# Patient Record
Sex: FEMALE | Race: WHITE | NOT HISPANIC OR LATINO | Employment: FULL TIME | URBAN - METROPOLITAN AREA
[De-identification: names, ages, dates, MRNs, and addresses within clinical notes are randomized per-mention and may not be internally consistent; named-entity substitution may affect disease eponyms.]

---

## 2017-02-03 ENCOUNTER — HISTORICAL (OUTPATIENT)
Dept: ADMINISTRATIVE | Facility: HOSPITAL | Age: 32
End: 2017-02-03

## 2018-11-28 ENCOUNTER — HISTORICAL (OUTPATIENT)
Dept: LAB | Facility: HOSPITAL | Age: 33
End: 2018-11-28

## 2019-01-28 ENCOUNTER — HISTORICAL (OUTPATIENT)
Dept: LAB | Facility: HOSPITAL | Age: 34
End: 2019-01-28

## 2019-05-06 ENCOUNTER — HISTORICAL (OUTPATIENT)
Dept: LAB | Facility: HOSPITAL | Age: 34
End: 2019-05-06

## 2019-06-21 ENCOUNTER — HISTORICAL (OUTPATIENT)
Dept: LAB | Facility: HOSPITAL | Age: 34
End: 2019-06-21

## 2019-12-27 ENCOUNTER — HISTORICAL (OUTPATIENT)
Dept: ADMINISTRATIVE | Facility: HOSPITAL | Age: 34
End: 2019-12-27

## 2020-05-05 ENCOUNTER — HISTORICAL (OUTPATIENT)
Dept: LAB | Facility: HOSPITAL | Age: 35
End: 2020-05-05

## 2021-09-07 LAB
PAP RECOMMENDATION EXT: NORMAL
PAP SMEAR: NORMAL

## 2022-10-04 ENCOUNTER — CLINICAL SUPPORT (OUTPATIENT)
Dept: URGENT CARE | Facility: CLINIC | Age: 37
End: 2022-10-04
Payer: COMMERCIAL

## 2022-10-04 DIAGNOSIS — R52 BODY ACHES: Primary | ICD-10-CM

## 2022-10-04 DIAGNOSIS — R52 BODY ACHES: ICD-10-CM

## 2022-10-04 LAB
CTP QC/QA: YES
SARS-COV-2 RDRP RESP QL NAA+PROBE: POSITIVE

## 2022-10-04 PROCEDURE — 87635 SARS-COV-2 COVID-19 AMP PRB: CPT | Mod: QW,,, | Performed by: INTERNAL MEDICINE

## 2022-10-04 PROCEDURE — 87635: ICD-10-PCS | Mod: QW,,, | Performed by: INTERNAL MEDICINE

## 2022-10-04 NOTE — PROGRESS NOTES
Subjective:       Patient ID: Berna Miranda is a 36 y.o. female.    Vitals:  vitals were not taken for this visit.     Chief Complaint: No chief complaint on file.    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   ROS    Objective:      Physical Exam      Assessment:       1. Body aches            Plan:         Body aches  -     POCT COVID-19 Rapid Screening

## 2022-10-07 ENCOUNTER — TELEPHONE (OUTPATIENT)
Dept: INTERNAL MEDICINE | Facility: CLINIC | Age: 37
End: 2022-10-07
Payer: COMMERCIAL

## 2022-10-07 ENCOUNTER — LAB VISIT (OUTPATIENT)
Dept: LAB | Facility: HOSPITAL | Age: 37
End: 2022-10-07
Attending: OBSTETRICS & GYNECOLOGY
Payer: COMMERCIAL

## 2022-10-07 DIAGNOSIS — E03.9 HYPOTHYROIDISM, UNSPECIFIED TYPE: Primary | ICD-10-CM

## 2022-10-07 DIAGNOSIS — E34.9 ENDOCRINE DISORDER RELATED TO PUBERTY: Primary | ICD-10-CM

## 2022-10-07 LAB
FSH SERPL-ACNC: 5.89 MIU/ML
PROLACTIN LEVEL (OHS): 5.9 NG/ML (ref 5.18–26.53)
TSH SERPL-ACNC: 0.02 UIU/ML (ref 0.35–4.94)

## 2022-10-07 PROCEDURE — 83001 ASSAY OF GONADOTROPIN (FSH): CPT

## 2022-10-07 PROCEDURE — 36415 COLL VENOUS BLD VENIPUNCTURE: CPT

## 2022-10-07 PROCEDURE — 84443 ASSAY THYROID STIM HORMONE: CPT

## 2022-10-07 PROCEDURE — 84146 ASSAY OF PROLACTIN: CPT

## 2022-10-07 RX ORDER — LEVOTHYROXINE SODIUM 175 UG/1
175 TABLET ORAL DAILY
Qty: 90 TABLET | Refills: 1 | Status: SHIPPED | OUTPATIENT
Start: 2022-10-07 | End: 2022-10-12 | Stop reason: DRUGHIGH

## 2022-10-07 RX ORDER — LEVOTHYROXINE SODIUM 175 UG/1
175 TABLET ORAL DAILY
COMMUNITY
Start: 2022-09-09 | End: 2022-10-07 | Stop reason: SDUPTHER

## 2022-10-11 ENCOUNTER — TELEPHONE (OUTPATIENT)
Dept: INTERNAL MEDICINE | Facility: CLINIC | Age: 37
End: 2022-10-11
Payer: COMMERCIAL

## 2022-10-12 ENCOUNTER — OFFICE VISIT (OUTPATIENT)
Dept: INTERNAL MEDICINE | Facility: CLINIC | Age: 37
End: 2022-10-12
Payer: COMMERCIAL

## 2022-10-12 DIAGNOSIS — E03.9 ACQUIRED HYPOTHYROIDISM: Primary | ICD-10-CM

## 2022-10-12 PROCEDURE — 1159F PR MEDICATION LIST DOCUMENTED IN MEDICAL RECORD: ICD-10-PCS | Mod: CPTII,95,, | Performed by: NURSE PRACTITIONER

## 2022-10-12 PROCEDURE — 1160F PR REVIEW ALL MEDS BY PRESCRIBER/CLIN PHARMACIST DOCUMENTED: ICD-10-PCS | Mod: CPTII,95,, | Performed by: NURSE PRACTITIONER

## 2022-10-12 PROCEDURE — 99214 PR OFFICE/OUTPT VISIT, EST, LEVL IV, 30-39 MIN: ICD-10-PCS | Mod: 95,,, | Performed by: NURSE PRACTITIONER

## 2022-10-12 PROCEDURE — 1159F MED LIST DOCD IN RCRD: CPT | Mod: CPTII,95,, | Performed by: NURSE PRACTITIONER

## 2022-10-12 PROCEDURE — 99214 OFFICE O/P EST MOD 30 MIN: CPT | Mod: 95,,, | Performed by: NURSE PRACTITIONER

## 2022-10-12 PROCEDURE — 1160F RVW MEDS BY RX/DR IN RCRD: CPT | Mod: CPTII,95,, | Performed by: NURSE PRACTITIONER

## 2022-10-12 RX ORDER — LEVOTHYROXINE SODIUM 150 UG/1
150 TABLET ORAL
Qty: 30 TABLET | Refills: 11 | Status: SHIPPED | OUTPATIENT
Start: 2022-10-12 | End: 2022-11-22 | Stop reason: DRUGHIGH

## 2022-10-12 NOTE — PROGRESS NOTES
Patient ID: [id number]     Chief Complaint: Follow-up (Pt saw Dr Bloom and he did labs but TSH is really low and wanted her to f/u with provider and wants to know if synthroid med needs adjusting.)      HPI:     This is a telemedicine note. Patient was treated using telemedicine, real time audio and video, according to [facility] protocols. I, Anastasiia SHETH, conducted the visit from the [facility]. The patient participated in the visit at a non-[facility] location selected by the patient, identified below. I am licensed in the state where the patient stated they are located. The patient stated that they understood and accepted the privacy and security risks to their information at their location. This visit is not recorded.    Patient was located at the patient's home.       Berna Miranda is a 36 y.o. female here today for a telemedicine visit. Patient currently undergoing eval for fertility and TSH noted to be suppressed. Feeling ok otherwise without complaints.        ----------------------------  Hypothyroidism, unspecified     Past Surgical History:   Procedure Laterality Date    THYROIDECTOMY  2013       Review of patient's allergies indicates:  No Known Allergies    No outpatient medications have been marked as taking for the 10/12/22 encounter (Office Visit) with Anastasiia Trevino NP.       Social History     Socioeconomic History    Marital status:    Tobacco Use    Smoking status: Never    Smokeless tobacco: Never   Substance and Sexual Activity    Alcohol use: Yes     Alcohol/week: 2.0 - 3.0 standard drinks     Types: 2 - 3 Glasses of wine per week    Sexual activity: Yes        Family History   Problem Relation Age of Onset    Hypertension Father     Atrial fibrillation Father         Patient Care Team:  Primary Doctor No as PCP - General  Primary Doctor No      Subjective:       ROS  Answers submitted by the patient for this visit:  Review of Systems Questionnaire  (Submitted on 10/11/2022)  activity change: No  trouble swallowing: No  chest tightness: No  difficulty urinating: No  dysphoric mood: No    See HPI for details    All Other ROS: Negative except as stated in HPI.       Objective:     There were no vitals taken for this visit.    Physical Exam    Physical Exam: LIMITED DUE TO TELEMEDICINE RESTRICTIONS.  General: Alert and oriented, No acute distress.  Head: Normocephalic, Atraumatic.  Eye: Sclera non-icteric.  Neck/Thyroid:  Full range of motion.  Respiratory: Non-labored respirations, Symmetrical chest wall expansion.  Neurologic: No focal deficits  Psychiatric: Normal interaction, Coherent speech, Euthymic mood, Appropriate affect       Assessment:       ICD-10-CM ICD-9-CM   1. Acquired hypothyroidism  E03.9 244.9        Plan:     1. Acquired hypothyroidism  - levothyroxine (SYNTHROID) 150 MCG tablet; Take 1 tablet (150 mcg total) by mouth before breakfast.  Dispense: 30 tablet; Refill: 11  - TSH; Future  - T4, Free; Future    Dec levothyroxine to 150 mcg daily. Repeat TSH/free T4 in 6-8 weeks. Will forward results to Gyn once received.     Medication List with Changes/Refills   New Medications    LEVOTHYROXINE (SYNTHROID) 150 MCG TABLET    Take 1 tablet (150 mcg total) by mouth before breakfast.       Start Date: 10/12/2022End Date: 10/12/2023   Current Medications    LEVOTHYROXINE (SYNTHROID, LEVOTHROID) 175 MCG TABLET    Take 1 tablet (175 mcg total) by mouth once daily.       Start Date: 10/7/2022 End Date: --          No follow-ups on file. In addition to their scheduled follow up, the patient has also been instructed to follow up on as needed basis.       Video Time Documentation:  Spent 10 minutes with patient face to face discussed health concerns. More than 50% of this time was spent in counseling and coordination of care.

## 2022-11-22 ENCOUNTER — LAB VISIT (OUTPATIENT)
Dept: LAB | Facility: HOSPITAL | Age: 37
End: 2022-11-22
Attending: NURSE PRACTITIONER
Payer: COMMERCIAL

## 2022-11-22 DIAGNOSIS — E03.9 HYPOTHYROIDISM, UNSPECIFIED TYPE: Primary | ICD-10-CM

## 2022-11-22 DIAGNOSIS — E03.9 ACQUIRED HYPOTHYROIDISM: ICD-10-CM

## 2022-11-22 LAB
T4 FREE SERPL-MCNC: 1.33 NG/DL (ref 0.7–1.48)
TSH SERPL-ACNC: 0.12 UIU/ML (ref 0.35–4.94)

## 2022-11-22 PROCEDURE — 36415 COLL VENOUS BLD VENIPUNCTURE: CPT

## 2022-11-22 PROCEDURE — 84439 ASSAY OF FREE THYROXINE: CPT

## 2022-11-22 PROCEDURE — 84443 ASSAY THYROID STIM HORMONE: CPT

## 2022-11-22 RX ORDER — LEVOTHYROXINE SODIUM 137 UG/1
137 TABLET ORAL
Qty: 30 TABLET | Refills: 11 | Status: SHIPPED | OUTPATIENT
Start: 2022-11-22 | End: 2023-08-21 | Stop reason: SDUPTHER

## 2023-01-04 ENCOUNTER — LAB VISIT (OUTPATIENT)
Dept: LAB | Facility: HOSPITAL | Age: 38
End: 2023-01-04
Attending: INTERNAL MEDICINE
Payer: COMMERCIAL

## 2023-01-04 ENCOUNTER — TELEPHONE (OUTPATIENT)
Dept: INTERNAL MEDICINE | Facility: CLINIC | Age: 38
End: 2023-01-04
Payer: COMMERCIAL

## 2023-01-04 DIAGNOSIS — E03.9 HYPOTHYROIDISM, UNSPECIFIED TYPE: ICD-10-CM

## 2023-01-04 LAB
T4 FREE SERPL-MCNC: 1.17 NG/DL (ref 0.7–1.48)
TSH SERPL-ACNC: 2.38 UIU/ML (ref 0.35–4.94)

## 2023-01-04 PROCEDURE — 36415 COLL VENOUS BLD VENIPUNCTURE: CPT

## 2023-01-04 PROCEDURE — 84439 ASSAY OF FREE THYROXINE: CPT

## 2023-01-04 PROCEDURE — 84443 ASSAY THYROID STIM HORMONE: CPT

## 2023-01-04 NOTE — TELEPHONE ENCOUNTER
----- Message from Anastasiia Trevino NP sent at 1/4/2023 12:20 PM CST -----  Please let pt know repeat labs stable.  Continue current dose of meds

## 2023-01-07 ENCOUNTER — DOCUMENTATION ONLY (OUTPATIENT)
Dept: INTERNAL MEDICINE | Facility: CLINIC | Age: 38
End: 2023-01-07
Payer: COMMERCIAL

## 2023-02-27 ENCOUNTER — LAB REQUISITION (OUTPATIENT)
Dept: LAB | Facility: HOSPITAL | Age: 38
End: 2023-02-27
Payer: COMMERCIAL

## 2023-02-27 DIAGNOSIS — Z34.01 ENCOUNTER FOR SUPERVISION OF NORMAL FIRST PREGNANCY, FIRST TRIMESTER: ICD-10-CM

## 2023-02-27 PROCEDURE — 87491 CHLMYD TRACH DNA AMP PROBE: CPT | Performed by: OBSTETRICS & GYNECOLOGY

## 2023-03-02 LAB
C TRACH RRNA SPEC QL NAA+PROBE: NEGATIVE
N GONORRHOEA RRNA SPEC QL NAA+PROBE: NEGATIVE
SPECIMEN SOURCE: NORMAL
SPECIMEN SOURCE: NORMAL

## 2023-03-03 ENCOUNTER — LAB VISIT (OUTPATIENT)
Dept: LAB | Facility: HOSPITAL | Age: 38
End: 2023-03-03
Attending: OBSTETRICS & GYNECOLOGY
Payer: COMMERCIAL

## 2023-03-03 DIAGNOSIS — Z34.01 ENCOUNTER FOR SUPERVISION OF NORMAL FIRST PREGNANCY IN FIRST TRIMESTER: Primary | ICD-10-CM

## 2023-03-03 LAB
ABORH RETYPE: NORMAL
ERYTHROCYTE [DISTWIDTH] IN BLOOD BY AUTOMATED COUNT: 11.9 % (ref 11.5–17)
GROUP & RH: NORMAL
HBV SURFACE AG SERPL QL IA: NONREACTIVE
HCT VFR BLD AUTO: 43.4 % (ref 37–47)
HGB BLD-MCNC: 14.8 G/DL (ref 12–16)
HIV 1+2 AB+HIV1 P24 AG SERPL QL IA: NONREACTIVE
INDIRECT COOMBS GEL: NORMAL
MCH RBC QN AUTO: 30.1 PG
MCHC RBC AUTO-ENTMCNC: 34.1 G/DL (ref 33–36)
MCV RBC AUTO: 88.4 FL (ref 80–94)
NRBC BLD AUTO-RTO: 0 %
PLATELET # BLD AUTO: 260 X10(3)/MCL (ref 130–400)
PMV BLD AUTO: 9.3 FL (ref 7.4–10.4)
RBC # BLD AUTO: 4.91 X10(6)/MCL (ref 4.2–5.4)
RUBELLA IMMUNE STATUS: NORMAL
T PALLIDUM AB SER QL: NONREACTIVE
WBC # SPEC AUTO: 10.2 X10(3)/MCL (ref 4.5–11.5)

## 2023-03-03 PROCEDURE — 87088 URINE BACTERIA CULTURE: CPT

## 2023-03-03 PROCEDURE — 87340 HEPATITIS B SURFACE AG IA: CPT

## 2023-03-03 PROCEDURE — 36415 COLL VENOUS BLD VENIPUNCTURE: CPT

## 2023-03-03 PROCEDURE — 86762 RUBELLA ANTIBODY: CPT

## 2023-03-03 PROCEDURE — 86780 TREPONEMA PALLIDUM: CPT

## 2023-03-03 PROCEDURE — 85660 RBC SICKLE CELL TEST: CPT

## 2023-03-03 PROCEDURE — 85027 COMPLETE CBC AUTOMATED: CPT

## 2023-03-03 PROCEDURE — 86900 BLOOD TYPING SEROLOGIC ABO: CPT | Performed by: OBSTETRICS & GYNECOLOGY

## 2023-03-03 PROCEDURE — 87389 HIV-1 AG W/HIV-1&-2 AB AG IA: CPT

## 2023-03-04 LAB
RUBV IGG SERPL IA-ACNC: 2
RUBV IGG SERPL QL IA: POSITIVE

## 2023-03-05 LAB
BACTERIA UR CULT: NORMAL
HGB S BLD QL SOLY: NEGATIVE

## 2023-03-20 ENCOUNTER — TELEPHONE (OUTPATIENT)
Dept: INTERNAL MEDICINE | Facility: CLINIC | Age: 38
End: 2023-03-20
Payer: COMMERCIAL

## 2023-03-20 DIAGNOSIS — Z00.00 WELLNESS EXAMINATION: Primary | ICD-10-CM

## 2023-03-20 DIAGNOSIS — E03.9 HYPOTHYROIDISM, UNSPECIFIED TYPE: ICD-10-CM

## 2023-03-20 NOTE — TELEPHONE ENCOUNTER
----- Message from Ernestine Maya MA sent at 3/20/2023 11:20 AM CDT -----  Regarding: YESSENIA Laurent 3/29@8:20  Please call pt and remind of appt dated above and ask to have fasting labs done prior. Please and thank you.

## 2023-03-23 ENCOUNTER — TELEPHONE (OUTPATIENT)
Dept: INTERNAL MEDICINE | Facility: CLINIC | Age: 38
End: 2023-03-23
Payer: COMMERCIAL

## 2023-03-23 ENCOUNTER — LAB VISIT (OUTPATIENT)
Dept: LAB | Facility: HOSPITAL | Age: 38
End: 2023-03-23
Attending: INTERNAL MEDICINE
Payer: COMMERCIAL

## 2023-03-23 DIAGNOSIS — O99.282 HYPOTHYROIDISM DURING PREGNANCY IN SECOND TRIMESTER: Primary | ICD-10-CM

## 2023-03-23 DIAGNOSIS — E03.9 HYPOTHYROIDISM DURING PREGNANCY IN SECOND TRIMESTER: Primary | ICD-10-CM

## 2023-03-23 DIAGNOSIS — E03.9 HYPOTHYROIDISM, UNSPECIFIED TYPE: Primary | ICD-10-CM

## 2023-03-23 DIAGNOSIS — Z00.00 WELLNESS EXAMINATION: ICD-10-CM

## 2023-03-23 DIAGNOSIS — E03.9 HYPOTHYROIDISM, UNSPECIFIED TYPE: ICD-10-CM

## 2023-03-23 LAB
ALBUMIN SERPL-MCNC: 3.7 G/DL (ref 3.5–5)
ALBUMIN/GLOB SERPL: 1.2 RATIO (ref 1.1–2)
ALP SERPL-CCNC: 53 UNIT/L (ref 40–150)
ALT SERPL-CCNC: 15 UNIT/L (ref 0–55)
AST SERPL-CCNC: 14 UNIT/L (ref 5–34)
BILIRUBIN DIRECT+TOT PNL SERPL-MCNC: 0.6 MG/DL
BUN SERPL-MCNC: 8 MG/DL (ref 7–18.7)
CALCIUM SERPL-MCNC: 9.4 MG/DL (ref 8.4–10.2)
CHLORIDE SERPL-SCNC: 107 MMOL/L (ref 98–107)
CHOLEST SERPL-MCNC: 236 MG/DL
CHOLEST/HDLC SERPL: 5 {RATIO} (ref 0–5)
CO2 SERPL-SCNC: 24 MMOL/L (ref 22–29)
CREAT SERPL-MCNC: 0.72 MG/DL (ref 0.55–1.02)
GFR SERPLBLD CREATININE-BSD FMLA CKD-EPI: >60 MLS/MIN/1.73/M2
GLOBULIN SER-MCNC: 3.2 GM/DL (ref 2.4–3.5)
GLUCOSE SERPL-MCNC: 96 MG/DL (ref 74–100)
HDLC SERPL-MCNC: 52 MG/DL (ref 35–60)
LDLC SERPL CALC-MCNC: 158 MG/DL (ref 50–140)
POTASSIUM SERPL-SCNC: 4.3 MMOL/L (ref 3.5–5.1)
PROT SERPL-MCNC: 6.9 GM/DL (ref 6.4–8.3)
SODIUM SERPL-SCNC: 136 MMOL/L (ref 136–145)
T4 FREE SERPL-MCNC: 0.98 NG/DL (ref 0.7–1.48)
TRIGL SERPL-MCNC: 128 MG/DL (ref 37–140)
TSH SERPL-ACNC: 11.58 UIU/ML (ref 0.35–4.94)
VLDLC SERPL CALC-MCNC: 26 MG/DL

## 2023-03-23 PROCEDURE — 84443 ASSAY THYROID STIM HORMONE: CPT

## 2023-03-23 PROCEDURE — 36415 COLL VENOUS BLD VENIPUNCTURE: CPT

## 2023-03-23 PROCEDURE — 84439 ASSAY OF FREE THYROXINE: CPT

## 2023-03-23 PROCEDURE — 80061 LIPID PANEL: CPT

## 2023-03-23 PROCEDURE — 80053 COMPREHEN METABOLIC PANEL: CPT

## 2023-03-23 NOTE — TELEPHONE ENCOUNTER
Please send STAT referral to [facility] Antonio, Dr. Baca or Dr. Venegas (whoever has 1st av). Patient 13 weeks pregnant and hypothyroid.

## 2023-03-23 NOTE — TELEPHONE ENCOUNTER
Spoke to pt and let her know the endo NP will get her in next week and will recheck thyroid levels in 6 weeks. Pt has been notified to take 2 levothyroxine 137 mcg on Sundays and Thursdays and continue regular dosing all other days. Pt expressed understanding

## 2023-03-23 NOTE — TELEPHONE ENCOUNTER
Pt called asking for results of additional lab and if you had spoken to Dr Pan, T4 is resulted and pt did see results on portal, but pt was waiting for a call back.

## 2023-03-23 NOTE — TELEPHONE ENCOUNTER
----- Message from Joy Jennings sent at 3/23/2023  9:19 AM CDT -----  Regarding: lab results  .Type:  Needs Medical Advice    Who Called: pt  Symptoms (please be specific):    How long has patient had these symptoms:    Pharmacy name and phone #:    Would the patient rather a call back or a response via [facility]? Call back  Best Call Back Number:[phone]  Additional Information: pt went to have fasting labs done this morning and is concerned with results. Pt is expecting and needs nurse to f/u.

## 2023-03-24 NOTE — TELEPHONE ENCOUNTER
Spoke to Loraine Mahan NP with Endo 3/23 (Dr. Rubi or Dr. Venegas?). Will inc patient's levothyroxine as noted. Gurinder, please ensure referral sent/received and patient appt set up. Thanks.

## 2023-03-28 ENCOUNTER — OFFICE VISIT (OUTPATIENT)
Dept: MATERNAL FETAL MEDICINE | Facility: CLINIC | Age: 38
End: 2023-03-28
Payer: COMMERCIAL

## 2023-03-28 ENCOUNTER — PROCEDURE VISIT (OUTPATIENT)
Dept: MATERNAL FETAL MEDICINE | Facility: CLINIC | Age: 38
End: 2023-03-28
Payer: COMMERCIAL

## 2023-03-28 VITALS
SYSTOLIC BLOOD PRESSURE: 116 MMHG | WEIGHT: 185.31 LBS | HEART RATE: 69 BPM | BODY MASS INDEX: 29.09 KG/M2 | HEIGHT: 67 IN | DIASTOLIC BLOOD PRESSURE: 74 MMHG

## 2023-03-28 DIAGNOSIS — O99.281 HYPOTHYROIDISM AFFECTING PREGNANCY IN FIRST TRIMESTER: ICD-10-CM

## 2023-03-28 DIAGNOSIS — E03.9 HYPOTHYROIDISM AFFECTING PREGNANCY IN FIRST TRIMESTER: ICD-10-CM

## 2023-03-28 DIAGNOSIS — O09.511 AMA (ADVANCED MATERNAL AGE) PRIMIGRAVIDA 35+, FIRST TRIMESTER: Primary | ICD-10-CM

## 2023-03-28 DIAGNOSIS — O09.511 AMA (ADVANCED MATERNAL AGE) PRIMIGRAVIDA 35+, FIRST TRIMESTER: ICD-10-CM

## 2023-03-28 DIAGNOSIS — O09.511 PRIMIGRAVIDA OF ADVANCED MATERNAL AGE IN FIRST TRIMESTER: ICD-10-CM

## 2023-03-28 PROCEDURE — 3074F PR MOST RECENT SYSTOLIC BLOOD PRESSURE < 130 MM HG: ICD-10-PCS | Mod: CPTII,S$GLB,, | Performed by: OBSTETRICS & GYNECOLOGY

## 2023-03-28 PROCEDURE — 1160F RVW MEDS BY RX/DR IN RCRD: CPT | Mod: CPTII,S$GLB,, | Performed by: OBSTETRICS & GYNECOLOGY

## 2023-03-28 PROCEDURE — 3078F DIAST BP <80 MM HG: CPT | Mod: CPTII,S$GLB,, | Performed by: OBSTETRICS & GYNECOLOGY

## 2023-03-28 PROCEDURE — 76813 OB US NUCHAL MEAS 1 GEST: CPT | Mod: S$GLB,,, | Performed by: OBSTETRICS & GYNECOLOGY

## 2023-03-28 PROCEDURE — 1159F PR MEDICATION LIST DOCUMENTED IN MEDICAL RECORD: ICD-10-PCS | Mod: CPTII,S$GLB,, | Performed by: OBSTETRICS & GYNECOLOGY

## 2023-03-28 PROCEDURE — 3074F SYST BP LT 130 MM HG: CPT | Mod: CPTII,S$GLB,, | Performed by: OBSTETRICS & GYNECOLOGY

## 2023-03-28 PROCEDURE — 99204 OFFICE O/P NEW MOD 45 MIN: CPT | Mod: 25,S$GLB,, | Performed by: OBSTETRICS & GYNECOLOGY

## 2023-03-28 PROCEDURE — 3008F PR BODY MASS INDEX (BMI) DOCUMENTED: ICD-10-PCS | Mod: CPTII,S$GLB,, | Performed by: OBSTETRICS & GYNECOLOGY

## 2023-03-28 PROCEDURE — 1160F PR REVIEW ALL MEDS BY PRESCRIBER/CLIN PHARMACIST DOCUMENTED: ICD-10-PCS | Mod: CPTII,S$GLB,, | Performed by: OBSTETRICS & GYNECOLOGY

## 2023-03-28 PROCEDURE — 99204 PR OFFICE/OUTPT VISIT, NEW, LEVL IV, 45-59 MIN: ICD-10-PCS | Mod: 25,S$GLB,, | Performed by: OBSTETRICS & GYNECOLOGY

## 2023-03-28 PROCEDURE — 76813 PR US, OB NUCHAL, TRANSABDOM/TRANSVAG, FIRST GESTATION: ICD-10-PCS | Mod: S$GLB,,, | Performed by: OBSTETRICS & GYNECOLOGY

## 2023-03-28 PROCEDURE — 3078F PR MOST RECENT DIASTOLIC BLOOD PRESSURE < 80 MM HG: ICD-10-PCS | Mod: CPTII,S$GLB,, | Performed by: OBSTETRICS & GYNECOLOGY

## 2023-03-28 PROCEDURE — 1159F MED LIST DOCD IN RCRD: CPT | Mod: CPTII,S$GLB,, | Performed by: OBSTETRICS & GYNECOLOGY

## 2023-03-28 PROCEDURE — 3008F BODY MASS INDEX DOCD: CPT | Mod: CPTII,S$GLB,, | Performed by: OBSTETRICS & GYNECOLOGY

## 2023-03-28 RX ORDER — ONDANSETRON 8 MG/1
TABLET, ORALLY DISINTEGRATING ORAL
COMMUNITY
Start: 2023-02-27

## 2023-03-28 RX ORDER — FAMOTIDINE 20 MG/1
20 TABLET, FILM COATED ORAL DAILY
COMMUNITY

## 2023-03-28 NOTE — PROGRESS NOTES
MATERNAL-FETAL MEDICINE   CONSULT NOTE    Provider requesting consultation: Dr FLORA Bloom    SUBJECTIVE:     Ms. Berna Miranda is a 37 y.o.  female with IUP at 13w3d who is seen in consultation by MFM for evaluation and management of:  Problem   1. Hypothyroidism affecting pregnancy in first trimester   2. Primigravida of advanced maternal age in first trimester       She is doing well and has no current complaints.  She has been referred due to a history of hypothyroidism after thyroidectomy 10 years ago (Hashimoto's thyroiditis with a goiter that was obstructing her airway).  She has been managed on Synthroid since that surgery and has been well controlled.  In October, her TSH was noted to be low at 0.024 and her Synthroid was decreased from 175 mcg to 150 mcg.  A month later her TSH was still low at 0.1 at her dose was decreased from 150 mcg to 137 mcg.  In January her TSH was 2.3 with normal free T4 and she continued 137 mg.  She then conceived her pregnancy and her last TSH on  was 11.5 with a free T4 of 0.98.  She states that she has had vomiting in the last few weeks in the morning after she takes her medication generally.  This has occurred about 2-3 days per week for the last few weeks.  Her primary care is referring her to Endocrinology and the endocrinology office has recommended that she take 137 mcg and 274mg(2 tablets) on  and Sundays at this time.     She is AMA and her NIPT was low risk.  She is having a boy!  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  They report no history of familial genetic disorders.    Medication List with Changes/Refills   Current Medications    FAMOTIDINE (PEPCID) 20 MG TABLET    Take 20 mg by mouth once daily.    LEVOTHYROXINE (SYNTHROID) 137 MCG TAB TABLET    Take 1 tablet (137 mcg total) by mouth before breakfast.    ONDANSETRON (ZOFRAN-ODT) 8 MG TBDL    DISSOLVE 1 TABLET ON YOUR TONGUE EVERY EIGHT TO TWELVE HOURS FOR NAUSEA OR  "VOMITING    PRENATAL VIT NO.124/IRON/FOLIC (PRENATAL VITAMIN ORAL)    Take by mouth.       Review of patient's allergies indicates:  No Known Allergies    PMH:  Past Medical History:   Diagnosis Date    Hypothyroidism, unspecified     s/p thyroidectomy       PObHx:  OB History    Para Term  AB Living   1             SAB IAB Ectopic Multiple Live Births                  # Outcome Date GA Lbr Gold/2nd Weight Sex Delivery Anes PTL Lv   1 Current                PSH:  Past Surgical History:   Procedure Laterality Date    THYROIDECTOMY         Family history:family history includes Atrial fibrillation in her father; Hypertension in her father.    Social history: reports that she has never smoked. She has never used smokeless tobacco. She reports that she does not currently use alcohol after a past usage of about 2.0 - 3.0 standard drinks per week. She reports that she does not use drugs.    Genetic history: The patient denies any inherited genetic diseases or birth defects in herself or her partner's personal history or family.    Objective:   /74 (BP Location: Right arm)   Pulse 69   Ht 5' 7" (1.702 m)   Wt 84.1 kg (185 lb 4.8 oz)   BMI 29.02 kg/m²     Ultrasound performed. See viewpoint for full ultrasound report.    A garcia living IUP is identified, and the CRL is appropriate for established gestational age. The NT measures normally at 1.0mm. Maternal structures such as uterus, cervix, and ovaries appear normal.    Significant labs/imaging: low risk NIPT    ASSESSMENT/PLAN:     37 y.o.  female with IUP at 13w3d     1. Hypothyroidism affecting pregnancy in first trimester  She has a history of Hashimoto's, thyroid nodules, and a goiter (airway obstruction). She had a thyroidectomy 10 years ago and has been on replacement. In October, her dosage was decreased due to low TSH. Dosage decreased, again, in November for persistently low TSH. She remained on Levothyroxine 137mcg due to stable " TSH until recently. She repeated labs in the end of March which revealed a TSH level of 11. She was instructed by her internal med team to take two 137mcg tablets twice weekly on Thursdays and Sundays, and take 1 tablet the rest of the days of the week. An endocrinology consult was placed which is expected to be scheduled this week.    Thyroid requirements increase in pregnancy due to increases in metabolism and thyroid binding globulin. Thyroid function tests should be followed closely to ensure adequate treatment.  Hypothyroidism has been associated with higher pregnancy complication rates including miscarriage, preeclampsia, abruption, low birth weight and stillbirth. Untreated hypothyroidism has also been associated with adverse fetal neurological development, but well treated hypothyroidism (i.e., euthyroid state) carries an excellent prognosis for mother and baby.      Recommendations:  Medication management:  Continue Synthroid at the current dose of 137 micrograms/day (274mcg 2days/week, 137mcg 5days/week) until endocrinology consult and further recs. She has been referred by her PCP and is anticipating appointment in the next 2 weeks.  Up to 1/3 of women may require increased hormone replacement in the first trimester, thus consider an empiric 25% increase in medication dose at pregnancy confirmation, while discharging postpartum patients on their pre-pregnancy dose.  Laboratory evaluation:   TSH should be monitored at least every trimester, or every 4 weeks after any medication adjustment. Adjust treatment as necessary to maintain TSH goal < 2.5 mIU/L.  We will monitor fetal growth q4-6 weeks due to increased risk of growth restriction.       2. Primigravida of advanced maternal age in first trimester  Today I counseled the patient on the relationship between maternal age and fetal aneuploidy.  We discussed the risks and benefits of screening tests versus definitive genetic testing (amniocentesis). She was  counseled about her specific age-related risk of fetal aneuploidy. We discussed the limitations of ultrasound in the definitive diagnosis of fetal aneuploidy.  We also discussed cell free fetal DNA screening including the sensitivity and specificity of the test.  Her questions were answered and after today's consultation she did not want to pursue amniocentesis.  She completed a NIPT with low risk results.  Additionally, we discussed the association between advanced maternal age (AMA) and preeclampsia, gestational diabetes, and fetal growth restriction.    Recommendations:  Detailed anatomic survey at 19-20 weeks    Follow-up fetal ultrasound at 32-34 weeks for interval fetal growth  Initiate low dose aspirin at 12-16 weeks for preeclampsia risk reduction          FOLLOW UP:   F/u in 5 weeks for US/MFM visit    This consultation was completed with the assistance of Maribell Trevino NP.      Janice Ramirez  Maternal-Fetal Medicine    Electronically Signed by Janice Ramirez March 28, 2023

## 2023-03-28 NOTE — ASSESSMENT & PLAN NOTE
Today I counseled the patient on the relationship between maternal age and fetal aneuploidy.  We discussed the risks and benefits of screening tests versus definitive genetic testing (amniocentesis). She was counseled about her specific age-related risk of fetal aneuploidy. We discussed the limitations of ultrasound in the definitive diagnosis of fetal aneuploidy.  We also discussed cell free fetal DNA screening including the sensitivity and specificity of the test.  Her questions were answered and after today's consultation she did not want to pursue amniocentesis.  She completed a NIPT with low risk results.  Additionally, we discussed the association between advanced maternal age (AMA) and preeclampsia, gestational diabetes, and fetal growth restriction.    Recommendations:   Detailed anatomic survey at 19-20 weeks     Follow-up fetal ultrasound at 32-34 weeks for interval fetal growth   Initiate low dose aspirin at 12-16 weeks for preeclampsia risk reduction

## 2023-03-28 NOTE — ASSESSMENT & PLAN NOTE
She has a history of Hashimoto's, thyroid nodules, and a goiter (airway obstruction). She had a thyroidectomy 10 years ago and has been on replacement. In October, her dosage was decreased due to low TSH. Dosage decreased, again, in November for persistently low TSH. She remained on Levothyroxine 137mcg due to stable TSH until recently. She repeated labs in the end of March which revealed a TSH level of 11. She was instructed by her internal med team to take two 137mcg tablets twice weekly on Thursdays and Sundays, and take 1 tablet the rest of the days of the week. An endocrinology consult was placed which is expected to be scheduled this week.    Thyroid requirements increase in pregnancy due to increases in metabolism and thyroid binding globulin. Thyroid function tests should be followed closely to ensure adequate treatment.  Hypothyroidism has been associated with higher pregnancy complication rates including miscarriage, preeclampsia, abruption, low birth weight and stillbirth. Untreated hypothyroidism has also been associated with adverse fetal neurological development, but well treated hypothyroidism (i.e., euthyroid state) carries an excellent prognosis for mother and baby.      Recommendations:  Medication management:   Continue Synthroid at the current dose of 137 micrograms/day (274mcg 2days/week, 137mcg 5days/week) until endocrinology consult and further recs. She has been referred by her PCP and is anticipating appointment in the next 2 weeks.   Up to 1/3 of women may require increased hormone replacement in the first trimester, thus consider an empiric 25% increase in medication dose at pregnancy confirmation, while discharging postpartum patients on their pre-pregnancy dose.  Laboratory evaluation:   TSH should be monitored at least every trimester, or every 4 weeks after any medication adjustment. Adjust treatment as necessary to maintain TSH goal < 2.5 mIU/L.  · We will monitor fetal growth q4-6  weeks due to increased risk of growth restriction.

## 2023-03-29 ENCOUNTER — OFFICE VISIT (OUTPATIENT)
Dept: INTERNAL MEDICINE | Facility: CLINIC | Age: 38
End: 2023-03-29
Payer: COMMERCIAL

## 2023-03-29 VITALS
WEIGHT: 187 LBS | RESPIRATION RATE: 14 BRPM | HEART RATE: 79 BPM | DIASTOLIC BLOOD PRESSURE: 82 MMHG | BODY MASS INDEX: 29.35 KG/M2 | OXYGEN SATURATION: 98 % | HEIGHT: 67 IN | SYSTOLIC BLOOD PRESSURE: 124 MMHG

## 2023-03-29 DIAGNOSIS — E03.9 HYPOTHYROIDISM AFFECTING PREGNANCY IN FIRST TRIMESTER: ICD-10-CM

## 2023-03-29 DIAGNOSIS — Z00.00 WELL ADULT EXAM: Primary | ICD-10-CM

## 2023-03-29 DIAGNOSIS — O99.281 HYPOTHYROIDISM AFFECTING PREGNANCY IN FIRST TRIMESTER: ICD-10-CM

## 2023-03-29 PROBLEM — E07.9 DISORDER OF THYROID: Status: ACTIVE | Noted: 2023-03-29

## 2023-03-29 PROBLEM — Z97.3 WEARS GLASSES: Status: ACTIVE | Noted: 2023-03-29

## 2023-03-29 PROBLEM — F41.9 ANXIETY: Status: ACTIVE | Noted: 2023-03-29

## 2023-03-29 PROBLEM — F32.A DEPRESSION: Status: ACTIVE | Noted: 2023-03-29

## 2023-03-29 PROCEDURE — 99395 PR PREVENTIVE VISIT,EST,18-39: ICD-10-PCS | Mod: ,,, | Performed by: NURSE PRACTITIONER

## 2023-03-29 PROCEDURE — 1159F PR MEDICATION LIST DOCUMENTED IN MEDICAL RECORD: ICD-10-PCS | Mod: CPTII,,, | Performed by: NURSE PRACTITIONER

## 2023-03-29 PROCEDURE — 3074F PR MOST RECENT SYSTOLIC BLOOD PRESSURE < 130 MM HG: ICD-10-PCS | Mod: CPTII,,, | Performed by: NURSE PRACTITIONER

## 2023-03-29 PROCEDURE — 1159F MED LIST DOCD IN RCRD: CPT | Mod: CPTII,,, | Performed by: NURSE PRACTITIONER

## 2023-03-29 PROCEDURE — 99395 PREV VISIT EST AGE 18-39: CPT | Mod: ,,, | Performed by: NURSE PRACTITIONER

## 2023-03-29 PROCEDURE — 3008F BODY MASS INDEX DOCD: CPT | Mod: CPTII,,, | Performed by: NURSE PRACTITIONER

## 2023-03-29 PROCEDURE — 3008F PR BODY MASS INDEX (BMI) DOCUMENTED: ICD-10-PCS | Mod: CPTII,,, | Performed by: NURSE PRACTITIONER

## 2023-03-29 PROCEDURE — 3074F SYST BP LT 130 MM HG: CPT | Mod: CPTII,,, | Performed by: NURSE PRACTITIONER

## 2023-03-29 PROCEDURE — 3079F PR MOST RECENT DIASTOLIC BLOOD PRESSURE 80-89 MM HG: ICD-10-PCS | Mod: CPTII,,, | Performed by: NURSE PRACTITIONER

## 2023-03-29 PROCEDURE — 3079F DIAST BP 80-89 MM HG: CPT | Mod: CPTII,,, | Performed by: NURSE PRACTITIONER

## 2023-03-29 PROCEDURE — 1160F PR REVIEW ALL MEDS BY PRESCRIBER/CLIN PHARMACIST DOCUMENTED: ICD-10-PCS | Mod: CPTII,,, | Performed by: NURSE PRACTITIONER

## 2023-03-29 PROCEDURE — 1160F RVW MEDS BY RX/DR IN RCRD: CPT | Mod: CPTII,,, | Performed by: NURSE PRACTITIONER

## 2023-03-29 NOTE — PROGRESS NOTES
"Subjective:      Patient ID: Berna Miranda is a 37 y.o. female.    Chief Complaint: Annual Exam (Pt is here for annual wellness and she is 13 weeks pregnant, pt tired and nauseous/)      HPI: Patient here today for annual wellness. Overall, doing ok. She saw MFM recently with rec to get in with Endo.  I did personally reach out to Dr. Venegas's NP, CHENTE Mahan, who stated they were backlogged and would get her in ASAP.  She is doing well.  Denies complaints.    Review of patient's allergies indicates:  No Known Allergies    Review of Systems  Constitutional: No fever, No chills, No sweats, No fatigue, No weight loss.  Eyes: No blurring.  Ear/Nose/Mouth/Throat: No nasal congestion, No vertigo.  Respiratory: No shortness of breath, No cough, No sputum production, No wheezing, No sleep apnea, No exertional dyspnea.   Cardiovascular: No chest pain, No palpitations, No claudication, No orthopnea, No peripheral edema.  Gastrointestinal: No nausea, No vomiting, No diarrhea, No rectal bleeding, No constipation, No abdominal pain.  Genitourinary: No dysuria, No hematuria, No frequency.  Endocrine: No excessive thirst, No polyuria, No cold intolerance, No heat intolerance.  Musculoskeletal: No joint pain, No muscle pain.  Integumentary: No rash, No ecchymosis.  Neurologic: No altered mental status, No headaches.  Psychiatrics: No anxiety,No depression, No SI/HI.  Objective:   Visit Vitals  /82 (BP Location: Left arm, Patient Position: Sitting, BP Method: Medium (Manual))   Pulse 79   Resp 14   Ht 5' 7" (1.702 m)   Wt 84.8 kg (187 lb)   SpO2 98%   BMI 29.29 kg/m²       Physical Exam  Vitals and nursing note reviewed.   Constitutional:       General: She is not in acute distress.     Appearance: Normal appearance. She is normal weight. She is not ill-appearing, toxic-appearing or diaphoretic.   HENT:      Head: Normocephalic and atraumatic.      Right Ear: Tympanic membrane, ear canal and external ear normal.      " Left Ear: Tympanic membrane, ear canal and external ear normal.      Nose: Nose normal.      Mouth/Throat:      Mouth: Mucous membranes are moist.      Pharynx: Oropharynx is clear. No oropharyngeal exudate or posterior oropharyngeal erythema.   Eyes:      General: No scleral icterus.        Right eye: No discharge.         Left eye: No discharge.      Extraocular Movements: Extraocular movements intact.      Conjunctiva/sclera: Conjunctivae normal.      Pupils: Pupils are equal, round, and reactive to light.   Neck:      Vascular: No carotid bruit.   Cardiovascular:      Rate and Rhythm: Normal rate and regular rhythm.      Pulses: Normal pulses.      Heart sounds: Normal heart sounds. No murmur heard.  Pulmonary:      Effort: Pulmonary effort is normal. No respiratory distress.      Breath sounds: Normal breath sounds. No wheezing or rhonchi.   Musculoskeletal:         General: Normal range of motion.      Cervical back: Normal range of motion and neck supple. No rigidity or tenderness.   Lymphadenopathy:      Cervical: No cervical adenopathy.   Skin:     General: Skin is warm and dry.   Neurological:      General: No focal deficit present.      Mental Status: She is alert and oriented to person, place, and time. Mental status is at baseline.      Motor: No weakness.   Psychiatric:         Mood and Affect: Mood normal.         Thought Content: Thought content normal.         Judgment: Judgment normal.       Assessment/Plan:   1. Well adult exam  Reviewed current labs with patient.  Follow up with specialists.    2. 1. Hypothyroidism affecting pregnancy in first trimester  Inc in levothyroxine as previously discussed.   Repeat labs in 3-4 weeks-will forward to Gyn and MFM    - TSH; Future  - T4, Free; Future      Medication List with Changes/Refills   Current Medications    FAMOTIDINE (PEPCID) 20 MG TABLET    Take 20 mg by mouth once daily.    LEVOTHYROXINE (SYNTHROID) 137 MCG TAB TABLET    Take 1 tablet (137 mcg  total) by mouth before breakfast.    ONDANSETRON (ZOFRAN-ODT) 8 MG TBDL    DISSOLVE 1 TABLET ON YOUR TONGUE EVERY EIGHT TO TWELVE HOURS FOR NAUSEA OR VOMITING    PRENATAL VIT NO.124/IRON/FOLIC (PRENATAL VITAMIN ORAL)    Take by mouth.        No follow-ups on file.    Chemistry:  Lab Results   Component Value Date     03/23/2023    K 4.3 03/23/2023    CHLORIDE 107 03/23/2023    BUN 8.0 03/23/2023    CREATININE 0.72 03/23/2023    EGFRNORACEVR >60 03/23/2023    GLUCOSE 96 03/23/2023    CALCIUM 9.4 03/23/2023    ALKPHOS 53 03/23/2023    LABPROT 6.9 03/23/2023    ALBUMIN 3.7 03/23/2023    AST 14 03/23/2023    ALT 15 03/23/2023        No results found for: HGBA1C, MICROALBCREA     Hematology:  Lab Results   Component Value Date    WBC 10.2 03/03/2023    HGB 14.8 03/03/2023    HCT 43.4 03/03/2023     03/03/2023       Lipid Panel:  Lab Results   Component Value Date    CHOL 236 (H) 03/23/2023    HDL 52 03/23/2023    .00 (H) 03/23/2023    TRIG 128 03/23/2023    TOTALCHOLEST 5 03/23/2023        Urine:  No results found for: COLORUA, APPEARANCEUA, SGUA, PHUA, PROTEINUA, GLUCOSEUA, KETONESUA, BLOODUA, NITRITESUA, LEUKOCYTESUR, RBCUA, WBCUA, BACTERIA, SQEPUA, HYALINECASTS, CREATRANDUR, PROTEINURINE, UPROTCREA

## 2023-04-05 ENCOUNTER — PATIENT MESSAGE (OUTPATIENT)
Dept: MATERNAL FETAL MEDICINE | Facility: CLINIC | Age: 38
End: 2023-04-05
Payer: COMMERCIAL

## 2023-04-25 ENCOUNTER — TELEPHONE (OUTPATIENT)
Dept: INTERNAL MEDICINE | Facility: CLINIC | Age: 38
End: 2023-04-25
Payer: COMMERCIAL

## 2023-04-25 ENCOUNTER — LAB VISIT (OUTPATIENT)
Dept: LAB | Facility: HOSPITAL | Age: 38
End: 2023-04-25
Attending: NURSE PRACTITIONER
Payer: COMMERCIAL

## 2023-04-25 ENCOUNTER — TELEPHONE (OUTPATIENT)
Dept: MATERNAL FETAL MEDICINE | Facility: CLINIC | Age: 38
End: 2023-04-25
Payer: COMMERCIAL

## 2023-04-25 DIAGNOSIS — O99.281 HYPOTHYROIDISM AFFECTING PREGNANCY IN FIRST TRIMESTER: ICD-10-CM

## 2023-04-25 DIAGNOSIS — E03.9 HYPOTHYROIDISM AFFECTING PREGNANCY IN FIRST TRIMESTER: ICD-10-CM

## 2023-04-25 LAB
T4 FREE SERPL-MCNC: 1.15 NG/DL (ref 0.7–1.48)
TSH SERPL-ACNC: 2.1 UIU/ML (ref 0.35–4.94)

## 2023-04-25 PROCEDURE — 84443 ASSAY THYROID STIM HORMONE: CPT

## 2023-04-25 PROCEDURE — 84439 ASSAY OF FREE THYROXINE: CPT

## 2023-04-25 PROCEDURE — 36415 COLL VENOUS BLD VENIPUNCTURE: CPT

## 2023-04-25 NOTE — TELEPHONE ENCOUNTER
Called to discuss TFTs. TSH normal. No change in levothyroxine dosing. Verbalized understanding and denies questions.

## 2023-04-25 NOTE — TELEPHONE ENCOUNTER
----- Message from Anastasiia Trevino NP sent at 4/25/2023 12:11 PM CDT -----  Please let pt know that TSH and free T4 normal. Repeat in 6 weeks.  Orders in system. May, please forward results to Maternal-Fetal specialist

## 2023-04-27 DIAGNOSIS — O99.282 HYPOTHYROIDISM AFFECTING PREGNANCY IN SECOND TRIMESTER: ICD-10-CM

## 2023-04-27 DIAGNOSIS — E03.9 HYPOTHYROIDISM AFFECTING PREGNANCY IN SECOND TRIMESTER: ICD-10-CM

## 2023-04-27 DIAGNOSIS — O09.512 AMA (ADVANCED MATERNAL AGE) PRIMIGRAVIDA 35+, SECOND TRIMESTER: Primary | ICD-10-CM

## 2023-05-01 ENCOUNTER — PROCEDURE VISIT (OUTPATIENT)
Dept: MATERNAL FETAL MEDICINE | Facility: CLINIC | Age: 38
End: 2023-05-01
Payer: COMMERCIAL

## 2023-05-01 ENCOUNTER — OFFICE VISIT (OUTPATIENT)
Dept: MATERNAL FETAL MEDICINE | Facility: CLINIC | Age: 38
End: 2023-05-01
Payer: COMMERCIAL

## 2023-05-01 VITALS
SYSTOLIC BLOOD PRESSURE: 121 MMHG | BODY MASS INDEX: 29.75 KG/M2 | DIASTOLIC BLOOD PRESSURE: 65 MMHG | WEIGHT: 189.94 LBS | HEART RATE: 80 BPM

## 2023-05-01 DIAGNOSIS — O44.42 LOW LYING PLACENTA NOS OR WITHOUT HEMORRHAGE, SECOND TRIMESTER: ICD-10-CM

## 2023-05-01 DIAGNOSIS — O09.512 AMA (ADVANCED MATERNAL AGE) PRIMIGRAVIDA 35+, SECOND TRIMESTER: ICD-10-CM

## 2023-05-01 DIAGNOSIS — E03.9 HYPOTHYROIDISM AFFECTING PREGNANCY IN SECOND TRIMESTER: ICD-10-CM

## 2023-05-01 DIAGNOSIS — O99.282 HYPOTHYROIDISM AFFECTING PREGNANCY IN SECOND TRIMESTER: ICD-10-CM

## 2023-05-01 DIAGNOSIS — O09.512 PRIMIGRAVIDA OF ADVANCED MATERNAL AGE IN SECOND TRIMESTER: ICD-10-CM

## 2023-05-01 PROCEDURE — 1159F MED LIST DOCD IN RCRD: CPT | Mod: CPTII,S$GLB,, | Performed by: OBSTETRICS & GYNECOLOGY

## 2023-05-01 PROCEDURE — 1159F PR MEDICATION LIST DOCUMENTED IN MEDICAL RECORD: ICD-10-PCS | Mod: CPTII,S$GLB,, | Performed by: OBSTETRICS & GYNECOLOGY

## 2023-05-01 PROCEDURE — 3008F BODY MASS INDEX DOCD: CPT | Mod: CPTII,S$GLB,, | Performed by: OBSTETRICS & GYNECOLOGY

## 2023-05-01 PROCEDURE — 3074F PR MOST RECENT SYSTOLIC BLOOD PRESSURE < 130 MM HG: ICD-10-PCS | Mod: CPTII,S$GLB,, | Performed by: OBSTETRICS & GYNECOLOGY

## 2023-05-01 PROCEDURE — 76811 OB US DETAILED SNGL FETUS: CPT | Mod: S$GLB,,, | Performed by: OBSTETRICS & GYNECOLOGY

## 2023-05-01 PROCEDURE — 76811 PR US, OB FETAL EVAL & EXAM, TRANSABDOM,FIRST GESTATION: ICD-10-PCS | Mod: S$GLB,,, | Performed by: OBSTETRICS & GYNECOLOGY

## 2023-05-01 PROCEDURE — 3008F PR BODY MASS INDEX (BMI) DOCUMENTED: ICD-10-PCS | Mod: CPTII,S$GLB,, | Performed by: OBSTETRICS & GYNECOLOGY

## 2023-05-01 PROCEDURE — 1160F RVW MEDS BY RX/DR IN RCRD: CPT | Mod: CPTII,S$GLB,, | Performed by: OBSTETRICS & GYNECOLOGY

## 2023-05-01 PROCEDURE — 3078F DIAST BP <80 MM HG: CPT | Mod: CPTII,S$GLB,, | Performed by: OBSTETRICS & GYNECOLOGY

## 2023-05-01 PROCEDURE — 99214 OFFICE O/P EST MOD 30 MIN: CPT | Mod: 25,S$GLB,, | Performed by: OBSTETRICS & GYNECOLOGY

## 2023-05-01 PROCEDURE — 1160F PR REVIEW ALL MEDS BY PRESCRIBER/CLIN PHARMACIST DOCUMENTED: ICD-10-PCS | Mod: CPTII,S$GLB,, | Performed by: OBSTETRICS & GYNECOLOGY

## 2023-05-01 PROCEDURE — 99214 PR OFFICE/OUTPT VISIT, EST, LEVL IV, 30-39 MIN: ICD-10-PCS | Mod: 25,S$GLB,, | Performed by: OBSTETRICS & GYNECOLOGY

## 2023-05-01 PROCEDURE — 3074F SYST BP LT 130 MM HG: CPT | Mod: CPTII,S$GLB,, | Performed by: OBSTETRICS & GYNECOLOGY

## 2023-05-01 PROCEDURE — 3078F PR MOST RECENT DIASTOLIC BLOOD PRESSURE < 80 MM HG: ICD-10-PCS | Mod: CPTII,S$GLB,, | Performed by: OBSTETRICS & GYNECOLOGY

## 2023-05-01 RX ORDER — ASPIRIN 81 MG/1
81 TABLET ORAL DAILY
COMMUNITY

## 2023-05-01 NOTE — ASSESSMENT & PLAN NOTE
We have reviewed the relationship between maternal age and fetal aneuploidy as well as the association between advanced maternal age (AMA) and preeclampsia, gestational diabetes, and fetal growth restriction.      She completed a NIPT with low risk results.    Recommendations:   Detailed anatomic survey at 19-20 weeks  (started today, some views limited)   Follow-up fetal ultrasound at 32-34 weeks for interval fetal growth   Initiate low dose aspirin at 12-16 weeks for preeclampsia risk reduction

## 2023-05-01 NOTE — PROGRESS NOTES
Maternal Fetal Medicine follow up consult    SUBJECTIVE:     Berna Miranda is a 37 y.o.  female with IUP at 18w2d who is seen in follow up consultation by MFM.  Pregnancy complications include:   Problem   Low Lying Placenta NOS Or Without Hemorrhage, Second Trimester   Hypothyroidism Affecting Pregnancy in Second Trimester   Primigravida of Advanced Maternal Age in Second Trimester     She is doing well and has no complaints.No LOF, VB, ctx.   We discussed recommended sleeping positions today and I addressed her concerns.    They have decided to name their baby boy Jada Mckenzie).    Previous notes reviewed.   No changes to medical, surgical, family, social, or obstetric history.    Interval history since last MFM visit: no changes    Medications:  Current Outpatient Medications   Medication Instructions    aspirin (ADULT LOW DOSE ASPIRIN) 81 mg, Oral, Daily    famotidine (PEPCID) 20 mg, Oral, Daily    levothyroxine (SYNTHROID) 137 mcg, Oral, Before breakfast    ondansetron (ZOFRAN-ODT) 8 MG TbDL DISSOLVE 1 TABLET ON YOUR TONGUE EVERY EIGHT TO TWELVE HOURS FOR NAUSEA OR VOMITING    prenatal vit no.124/iron/folic (PRENATAL VITAMIN ORAL) Oral       Care team members:  Dr FLORA Bloom - Primary OB     OBJECTIVE:   /65 (BP Location: Right arm)   Pulse 80   Wt 86.1 kg (189 lb 14.8 oz)   BMI 29.75 kg/m²     Ultrasound performed. See viewpoint for full ultrasound report.    A detailed fetal anatomic ultrasound examination was performed for the following high risk indication: AMA.   No fetal structural malformations are identified; however, fetal imaging is incomplete today for the above views.  A follow-up study will be scheduled to complete the fetal anatomic survey.   Fetal size today is consistent with established gestational age.   Cervical length by TA scanning is normal.   Placental location is posterior, low lying (1.5cm away from os) without evidence of previa.     ASSESSMENT/PLAN:     37 y.o.   female with IUP at 18w2d    Hypothyroidism affecting pregnancy in second trimester  She has a history of Hashimoto's, thyroid nodules, and a goiter (airway obstruction). She had a thyroidectomy 10 years ago and has been on replacement. In October, her dosage was decreased due to low TSH. Dosage decreased, again, in November for persistently low TSH. She remained on Levothyroxine 137mcg due to stable TSH until recently. She repeated labs in the end of March which revealed a TSH level of 11. She was instructed by her internal med team to take two 137mcg tablets twice weekly on Thursdays and Sundays, and take 1 tablet the rest of the days of the week.  She recently had an outpatient consult with an endocrinologist who agreed with current plan of care.    Thyroid requirements increase in pregnancy due to increases in metabolism and thyroid binding globulin. Thyroid function tests should be followed closely to ensure adequate treatment.  Hypothyroidism has been associated with higher pregnancy complication rates including miscarriage, preeclampsia, abruption, low birth weight and stillbirth. Untreated hypothyroidism has also been associated with adverse fetal neurological development, but well treated hypothyroidism (i.e., euthyroid state) carries an excellent prognosis for mother and baby.      4/25/23- TSH normal. Dosage kept the same (137mcg daily with the exception of Thursdays and Sundays where she takes a double dosage)    Recommendations:  Medication management:  Continue Synthroid at the current dose of 137 micrograms/day (274mcg 2days/week, 137mcg 5days/week) until endocrinology consult and further recs. She has been referred by her PCP and is anticipating appointment in the next 2 weeks.  Up to 1/3 of women may require increased hormone replacement in the first trimester, thus consider an empiric 25% increase in medication dose at pregnancy confirmation, while discharging postpartum patients on their  pre-pregnancy dose.  Laboratory evaluation:   TSH should be monitored at least every trimester, or every 4 weeks after any medication adjustment. Adjust treatment as necessary to maintain TSH goal < 2.5 mIU/L.  We will monitor fetal growth q4-6 weeks due to increased risk of growth restriction.       Primigravida of advanced maternal age in second trimester  We have reviewed the relationship between maternal age and fetal aneuploidy as well as the association between advanced maternal age (AMA) and preeclampsia, gestational diabetes, and fetal growth restriction.      She completed a NIPT with low risk results.    Recommendations:  Detailed anatomic survey at 19-20 weeks  (started today, some views limited)  Follow-up fetal ultrasound at 32-34 weeks for interval fetal growth  Initiate low dose aspirin at 12-16 weeks for preeclampsia risk reduction        Low lying placenta nos or without hemorrhage, second trimester  Posterior placenta measures 1.5 cm away from the internal os (transabdominally).    No transvaginal scan was completed today  She has not had any bleeding or abruption symptoms   A low-lying placenta is a normal variant in pregnancy at the mid gestation.  This is expected to resolve spontaneously in most circumstances and there is no need to alter pregnancy management or maternal activities.   We will consider transvaginal ultrasound in the future if placental location is indeterminate on transabdominal scan.      F/u in 4 weeks for MFM visit/ultrasound    Janice Ramirez MD  Maternal Fetal Medicine

## 2023-05-01 NOTE — ASSESSMENT & PLAN NOTE
She has a history of Hashimoto's, thyroid nodules, and a goiter (airway obstruction). She had a thyroidectomy 10 years ago and has been on replacement. In October, her dosage was decreased due to low TSH. Dosage decreased, again, in November for persistently low TSH. She remained on Levothyroxine 137mcg due to stable TSH until recently. She repeated labs in the end of March which revealed a TSH level of 11. She was instructed by her internal med team to take two 137mcg tablets twice weekly on Thursdays and Sundays, and take 1 tablet the rest of the days of the week.  She recently had an outpatient consult with an endocrinologist who agreed with current plan of care.    Thyroid requirements increase in pregnancy due to increases in metabolism and thyroid binding globulin. Thyroid function tests should be followed closely to ensure adequate treatment.  Hypothyroidism has been associated with higher pregnancy complication rates including miscarriage, preeclampsia, abruption, low birth weight and stillbirth. Untreated hypothyroidism has also been associated with adverse fetal neurological development, but well treated hypothyroidism (i.e., euthyroid state) carries an excellent prognosis for mother and baby.      4/25/23- TSH normal. Dosage kept the same (137mcg daily with the exception of Thursdays and Sundays where she takes a double dosage)    Recommendations:  Medication management:   Continue Synthroid at the current dose of 137 micrograms/day (274mcg 2days/week, 137mcg 5days/week) until endocrinology consult and further recs. She has been referred by her PCP and is anticipating appointment in the next 2 weeks.   Up to 1/3 of women may require increased hormone replacement in the first trimester, thus consider an empiric 25% increase in medication dose at pregnancy confirmation, while discharging postpartum patients on their pre-pregnancy dose.  Laboratory evaluation:   TSH should be monitored at least every  trimester, or every 4 weeks after any medication adjustment. Adjust treatment as necessary to maintain TSH goal < 2.5 mIU/L.  · We will monitor fetal growth q4-6 weeks due to increased risk of growth restriction.

## 2023-05-01 NOTE — ASSESSMENT & PLAN NOTE
Posterior placenta measures 1.5 cm away from the internal os (transabdominally).    No transvaginal scan was completed today  She has not had any bleeding or abruption symptoms   A low-lying placenta is a normal variant in pregnancy at the mid gestation.  This is expected to resolve spontaneously in most circumstances and there is no need to alter pregnancy management or maternal activities.   We will consider transvaginal ultrasound in the future if placental location is indeterminate on transabdominal scan.

## 2023-05-04 ENCOUNTER — TELEPHONE (OUTPATIENT)
Dept: INTERNAL MEDICINE | Facility: CLINIC | Age: 38
End: 2023-05-04
Payer: COMMERCIAL

## 2023-05-04 NOTE — TELEPHONE ENCOUNTER
Pt said we did call her last week but documentation is not in system also forwarded results to Maternal Fetal

## 2023-05-24 ENCOUNTER — TELEPHONE (OUTPATIENT)
Dept: INTERNAL MEDICINE | Facility: CLINIC | Age: 38
End: 2023-05-24
Payer: COMMERCIAL

## 2023-05-24 ENCOUNTER — LAB VISIT (OUTPATIENT)
Dept: LAB | Facility: HOSPITAL | Age: 38
End: 2023-05-24
Attending: NURSE PRACTITIONER
Payer: COMMERCIAL

## 2023-05-24 DIAGNOSIS — E03.9 HYPOTHYROIDISM AFFECTING PREGNANCY IN SECOND TRIMESTER: Primary | ICD-10-CM

## 2023-05-24 DIAGNOSIS — E03.9 HYPOTHYROIDISM AFFECTING PREGNANCY IN FIRST TRIMESTER: ICD-10-CM

## 2023-05-24 DIAGNOSIS — O09.512 PRIMIGRAVIDA OF ADVANCED MATERNAL AGE IN SECOND TRIMESTER: ICD-10-CM

## 2023-05-24 DIAGNOSIS — O99.282 HYPOTHYROIDISM AFFECTING PREGNANCY IN SECOND TRIMESTER: Primary | ICD-10-CM

## 2023-05-24 DIAGNOSIS — O99.281 HYPOTHYROIDISM AFFECTING PREGNANCY IN FIRST TRIMESTER: ICD-10-CM

## 2023-05-24 LAB
T4 FREE SERPL-MCNC: 1.02 NG/DL (ref 0.7–1.48)
TSH SERPL-ACNC: 1.11 UIU/ML (ref 0.35–4.94)

## 2023-05-24 PROCEDURE — 36415 COLL VENOUS BLD VENIPUNCTURE: CPT

## 2023-05-24 PROCEDURE — 84439 ASSAY OF FREE THYROXINE: CPT

## 2023-05-24 PROCEDURE — 84443 ASSAY THYROID STIM HORMONE: CPT

## 2023-05-24 NOTE — TELEPHONE ENCOUNTER
----- Message from Anastasiia Trevino NP sent at 5/24/2023  1:13 PM CDT -----  Please let pt know that labs stable.  May, please send to maternal-fetal medicine MD. Repeat in 1 month

## 2023-05-29 ENCOUNTER — OFFICE VISIT (OUTPATIENT)
Dept: MATERNAL FETAL MEDICINE | Facility: CLINIC | Age: 38
End: 2023-05-29
Payer: COMMERCIAL

## 2023-05-29 ENCOUNTER — PROCEDURE VISIT (OUTPATIENT)
Dept: MATERNAL FETAL MEDICINE | Facility: CLINIC | Age: 38
End: 2023-05-29
Payer: COMMERCIAL

## 2023-05-29 VITALS
BODY MASS INDEX: 30.06 KG/M2 | HEART RATE: 77 BPM | DIASTOLIC BLOOD PRESSURE: 58 MMHG | WEIGHT: 191.94 LBS | SYSTOLIC BLOOD PRESSURE: 102 MMHG

## 2023-05-29 DIAGNOSIS — O09.512 PRIMIGRAVIDA OF ADVANCED MATERNAL AGE IN SECOND TRIMESTER: ICD-10-CM

## 2023-05-29 DIAGNOSIS — O99.282 HYPOTHYROIDISM AFFECTING PREGNANCY IN SECOND TRIMESTER: ICD-10-CM

## 2023-05-29 DIAGNOSIS — O44.42 LOW LYING PLACENTA NOS OR WITHOUT HEMORRHAGE, SECOND TRIMESTER: ICD-10-CM

## 2023-05-29 DIAGNOSIS — E03.9 HYPOTHYROIDISM AFFECTING PREGNANCY IN SECOND TRIMESTER: ICD-10-CM

## 2023-05-29 PROCEDURE — 76816 PR  US,PREGNANT UTERUS,F/U,TRANSABD APP: ICD-10-PCS | Mod: 26,S$GLB,, | Performed by: OBSTETRICS & GYNECOLOGY

## 2023-05-29 PROCEDURE — 3008F PR BODY MASS INDEX (BMI) DOCUMENTED: ICD-10-PCS | Mod: CPTII,S$GLB,, | Performed by: OBSTETRICS & GYNECOLOGY

## 2023-05-29 PROCEDURE — 99214 PR OFFICE/OUTPT VISIT, EST, LEVL IV, 30-39 MIN: ICD-10-PCS | Mod: 25,S$GLB,, | Performed by: OBSTETRICS & GYNECOLOGY

## 2023-05-29 PROCEDURE — 1160F RVW MEDS BY RX/DR IN RCRD: CPT | Mod: CPTII,S$GLB,, | Performed by: OBSTETRICS & GYNECOLOGY

## 2023-05-29 PROCEDURE — 1159F MED LIST DOCD IN RCRD: CPT | Mod: CPTII,S$GLB,, | Performed by: OBSTETRICS & GYNECOLOGY

## 2023-05-29 PROCEDURE — 3074F SYST BP LT 130 MM HG: CPT | Mod: CPTII,S$GLB,, | Performed by: OBSTETRICS & GYNECOLOGY

## 2023-05-29 PROCEDURE — 76816 OB US FOLLOW-UP PER FETUS: CPT | Mod: 26,S$GLB,, | Performed by: OBSTETRICS & GYNECOLOGY

## 2023-05-29 PROCEDURE — 1159F PR MEDICATION LIST DOCUMENTED IN MEDICAL RECORD: ICD-10-PCS | Mod: CPTII,S$GLB,, | Performed by: OBSTETRICS & GYNECOLOGY

## 2023-05-29 PROCEDURE — 3074F PR MOST RECENT SYSTOLIC BLOOD PRESSURE < 130 MM HG: ICD-10-PCS | Mod: CPTII,S$GLB,, | Performed by: OBSTETRICS & GYNECOLOGY

## 2023-05-29 PROCEDURE — 99214 OFFICE O/P EST MOD 30 MIN: CPT | Mod: 25,S$GLB,, | Performed by: OBSTETRICS & GYNECOLOGY

## 2023-05-29 PROCEDURE — 3008F BODY MASS INDEX DOCD: CPT | Mod: CPTII,S$GLB,, | Performed by: OBSTETRICS & GYNECOLOGY

## 2023-05-29 PROCEDURE — 3078F DIAST BP <80 MM HG: CPT | Mod: CPTII,S$GLB,, | Performed by: OBSTETRICS & GYNECOLOGY

## 2023-05-29 PROCEDURE — 1160F PR REVIEW ALL MEDS BY PRESCRIBER/CLIN PHARMACIST DOCUMENTED: ICD-10-PCS | Mod: CPTII,S$GLB,, | Performed by: OBSTETRICS & GYNECOLOGY

## 2023-05-29 PROCEDURE — 3078F PR MOST RECENT DIASTOLIC BLOOD PRESSURE < 80 MM HG: ICD-10-PCS | Mod: CPTII,S$GLB,, | Performed by: OBSTETRICS & GYNECOLOGY

## 2023-05-29 NOTE — ASSESSMENT & PLAN NOTE
She has a history of Hashimoto's, thyroid nodules, and a goiter (airway obstruction). She had a thyroidectomy 10 years ago and has been on replacement. In October, her dosage was decreased due to low TSH. Dosage decreased, again, in November for persistently low TSH. She remained on Levothyroxine 137mcg due to stable TSH until recently. She repeated labs in the end of March which revealed a TSH level of 11. She was instructed by her internal med team to take two 137mcg tablets twice weekly on Thursdays and Sundays, and take 1 tablet the rest of the days of the week.  She recently had an outpatient consult with an endocrinologist who agreed with current plan of care.    Thyroid requirements increase in pregnancy due to increases in metabolism and thyroid binding globulin. Thyroid function tests should be followed closely to ensure adequate treatment.  Hypothyroidism has been associated with higher pregnancy complication rates including miscarriage, preeclampsia, abruption, low birth weight and stillbirth. Untreated hypothyroidism has also been associated with adverse fetal neurological development, but well treated hypothyroidism (i.e., euthyroid state) carries an excellent prognosis for mother and baby.      4/25/23- TSH normal. Dosage kept the same (137mcg daily with the exception of Thursdays and Sundays where she takes a double dosage)    5/29/23- Labs from last week reviewed. No changes.     Recommendations:  Medication management:   Continue Synthroid at the current dose of 137 micrograms/day (274mcg 2days/week, 137mcg 5days/week) until endocrinology consult and further recs. She has been referred by her PCP and is anticipating appointment in the next 2 weeks.   Up to 1/3 of women may require increased hormone replacement in the first trimester, thus consider an empiric 25% increase in medication dose at pregnancy confirmation, while discharging postpartum patients on their pre-pregnancy dose.  Laboratory  evaluation:   TSH should be monitored at least every trimester, or every 4 weeks after any medication adjustment. Adjust treatment as necessary to maintain TSH goal < 2.5 mIU/L.  · We will monitor fetal growth q6 weeks due to increased risk of growth restriction.

## 2023-05-29 NOTE — ASSESSMENT & PLAN NOTE
Posterior placenta measures 1.5 cm away from the internal os (transabdominally).    No transvaginal scan was completed today  She has not had any bleeding or abruption symptoms   A low-lying placenta is a normal variant in pregnancy at the mid gestation.  This is expected to resolve spontaneously in most circumstances and there is no need to alter pregnancy management or maternal activities.     5/29- TA ultrasound demonstrates normal placental location at this time (resolved).

## 2023-05-29 NOTE — PROGRESS NOTES
Maternal Fetal Medicine follow up consult    SUBJECTIVE:     Berna Miranda is a 37 y.o.  female with IUP at 22w2d who is seen in follow up consultation by MFM.  Pregnancy complications include:   Problem   Low Lying Placenta NOS Or Without Hemorrhage, Second Trimester   Hypothyroidism Affecting Pregnancy in Second Trimester   Primigravida of Advanced Maternal Age in Second Trimester       She is doing well and has no complaints.No LOF, VB, ctx.   She has noted significant fatigue and aversions to foods as her main pregnancy issues.     They have decided to name their baby boy Jada Mckenzie).    Previous notes reviewed.   No changes to medical, surgical, family, social, or obstetric history.    Interval history since last MFM visit: no changes    Medications:  Current Outpatient Medications   Medication Instructions    aspirin (ECOTRIN) 81 mg, Oral, Daily    famotidine (PEPCID) 20 mg, Oral, Daily    levothyroxine (SYNTHROID) 137 mcg, Oral, Before breakfast    ondansetron (ZOFRAN-ODT) 8 MG TbDL DISSOLVE 1 TABLET ON YOUR TONGUE EVERY EIGHT TO TWELVE HOURS FOR NAUSEA OR VOMITING    prenatal vit no.124/iron/folic (PRENATAL VITAMIN ORAL) Oral       Care team members:  Dr FLORA Bloom - Primary OB     OBJECTIVE:   BP (!) 102/58 (BP Location: Right arm)   Pulse 77   Wt 87.1 kg (191 lb 14.6 oz)   BMI 30.06 kg/m²     Ultrasound performed. See viewpoint for full ultrasound report.    A viable garcia pregnancy is visualized in breech presentation.  Estimated fetal weight is at the 24th percentile with an abdominal circumference at the 21st percentile.    No fetal abnormalities are noted and anatomic survey is complete. Amniotic fluid volume is normal.  Placenta is posterior.  The placenta is no longer low lying.     ASSESSMENT/PLAN:     37 y.o.  female with IUP at 22w2d    Low lying placenta nos or without hemorrhage, second trimester  Posterior placenta measures 1.5 cm away from the internal os  (transabdominally).    No transvaginal scan was completed today  She has not had any bleeding or abruption symptoms   A low-lying placenta is a normal variant in pregnancy at the mid gestation.  This is expected to resolve spontaneously in most circumstances and there is no need to alter pregnancy management or maternal activities.     5/29- TA ultrasound demonstrates normal placental location at this time (resolved).    Hypothyroidism affecting pregnancy in second trimester  She has a history of Hashimoto's, thyroid nodules, and a goiter (airway obstruction). She had a thyroidectomy 10 years ago and has been on replacement. In October, her dosage was decreased due to low TSH. Dosage decreased, again, in November for persistently low TSH. She remained on Levothyroxine 137mcg due to stable TSH until recently. She repeated labs in the end of March which revealed a TSH level of 11. She was instructed by her internal med team to take two 137mcg tablets twice weekly on Thursdays and Sundays, and take 1 tablet the rest of the days of the week.  She recently had an outpatient consult with an endocrinologist who agreed with current plan of care.    Thyroid requirements increase in pregnancy due to increases in metabolism and thyroid binding globulin. Thyroid function tests should be followed closely to ensure adequate treatment.  Hypothyroidism has been associated with higher pregnancy complication rates including miscarriage, preeclampsia, abruption, low birth weight and stillbirth. Untreated hypothyroidism has also been associated with adverse fetal neurological development, but well treated hypothyroidism (i.e., euthyroid state) carries an excellent prognosis for mother and baby.      4/25/23- TSH normal. Dosage kept the same (137mcg daily with the exception of Thursdays and Sundays where she takes a double dosage)    5/29/23- Labs from last week reviewed. No changes.     Recommendations:  Medication  management:  Continue Synthroid at the current dose of 137 micrograms/day (274mcg 2days/week, 137mcg 5days/week) until endocrinology consult and further recs. She has been referred by her PCP and is anticipating appointment in the next 2 weeks.  Up to 1/3 of women may require increased hormone replacement in the first trimester, thus consider an empiric 25% increase in medication dose at pregnancy confirmation, while discharging postpartum patients on their pre-pregnancy dose.  Laboratory evaluation:   TSH should be monitored at least every trimester, or every 4 weeks after any medication adjustment. Adjust treatment as necessary to maintain TSH goal < 2.5 mIU/L.  We will monitor fetal growth q6 weeks due to increased risk of growth restriction.       Primigravida of advanced maternal age in second trimester  We have reviewed the relationship between maternal age and fetal aneuploidy as well as the association between advanced maternal age (AMA) and preeclampsia, gestational diabetes, and fetal growth restriction.      She completed a NIPT with low risk results.    Recommendations:  Detailed anatomic survey at 19-20 weeks  (complete)  Follow-up fetal ultrasound at 32-34 weeks for interval fetal growth  Initiate low dose aspirin at 12-16 weeks for preeclampsia risk reduction          F/u in 6 weeks for MFM visit/ultrasound    Janice Ramirez MD  Maternal Fetal Medicine

## 2023-05-29 NOTE — ASSESSMENT & PLAN NOTE
We have reviewed the relationship between maternal age and fetal aneuploidy as well as the association between advanced maternal age (AMA) and preeclampsia, gestational diabetes, and fetal growth restriction.      She completed a NIPT with low risk results.    Recommendations:   Detailed anatomic survey at 19-20 weeks  (complete)   Follow-up fetal ultrasound at 32-34 weeks for interval fetal growth   Initiate low dose aspirin at 12-16 weeks for preeclampsia risk reduction

## 2023-07-07 ENCOUNTER — LAB VISIT (OUTPATIENT)
Dept: LAB | Facility: HOSPITAL | Age: 38
End: 2023-07-07
Attending: OBSTETRICS & GYNECOLOGY
Payer: COMMERCIAL

## 2023-07-07 ENCOUNTER — PATIENT MESSAGE (OUTPATIENT)
Dept: MATERNAL FETAL MEDICINE | Facility: CLINIC | Age: 38
End: 2023-07-07
Payer: COMMERCIAL

## 2023-07-07 DIAGNOSIS — Z34.01 ENCOUNTER FOR SUPERVISION OF NORMAL FIRST PREGNANCY IN FIRST TRIMESTER: Primary | ICD-10-CM

## 2023-07-07 LAB
ERYTHROCYTE [DISTWIDTH] IN BLOOD BY AUTOMATED COUNT: 12 % (ref 11.5–17)
GLUCOSE 1H P 100 G GLC PO SERPL-MCNC: 117 MG/DL (ref 100–180)
HCT VFR BLD AUTO: 34.8 % (ref 37–47)
HGB BLD-MCNC: 12 G/DL (ref 12–16)
MCH RBC QN AUTO: 30.8 PG (ref 27–31)
MCHC RBC AUTO-ENTMCNC: 34.5 G/DL (ref 33–36)
MCV RBC AUTO: 89.5 FL (ref 80–94)
NRBC BLD AUTO-RTO: 0 %
PLATELET # BLD AUTO: 203 X10(3)/MCL (ref 130–400)
PMV BLD AUTO: 9.3 FL (ref 7.4–10.4)
RBC # BLD AUTO: 3.89 X10(6)/MCL (ref 4.2–5.4)
WBC # SPEC AUTO: 9.58 X10(3)/MCL (ref 4.5–11.5)

## 2023-07-07 PROCEDURE — 85027 COMPLETE CBC AUTOMATED: CPT

## 2023-07-07 PROCEDURE — 82950 GLUCOSE TEST: CPT

## 2023-07-07 PROCEDURE — 36415 COLL VENOUS BLD VENIPUNCTURE: CPT

## 2023-07-10 ENCOUNTER — PATIENT MESSAGE (OUTPATIENT)
Dept: INTERNAL MEDICINE | Facility: CLINIC | Age: 38
End: 2023-07-10
Payer: COMMERCIAL

## 2023-07-10 ENCOUNTER — PATIENT MESSAGE (OUTPATIENT)
Dept: MATERNAL FETAL MEDICINE | Facility: CLINIC | Age: 38
End: 2023-07-10
Payer: COMMERCIAL

## 2023-07-10 ENCOUNTER — LAB VISIT (OUTPATIENT)
Dept: LAB | Facility: HOSPITAL | Age: 38
End: 2023-07-10
Attending: INTERNAL MEDICINE
Payer: COMMERCIAL

## 2023-07-10 DIAGNOSIS — O99.281 HYPOTHYROIDISM AFFECTING PREGNANCY IN FIRST TRIMESTER: ICD-10-CM

## 2023-07-10 DIAGNOSIS — E03.9 HYPOTHYROIDISM AFFECTING PREGNANCY IN FIRST TRIMESTER: ICD-10-CM

## 2023-07-10 DIAGNOSIS — E03.9 HYPOTHYROIDISM AFFECTING PREGNANCY IN FIRST TRIMESTER: Primary | ICD-10-CM

## 2023-07-10 DIAGNOSIS — O99.281 HYPOTHYROIDISM AFFECTING PREGNANCY IN FIRST TRIMESTER: Primary | ICD-10-CM

## 2023-07-10 LAB
T4 FREE SERPL-MCNC: 0.9 NG/DL (ref 0.7–1.48)
TSH SERPL-ACNC: 0.97 UIU/ML (ref 0.35–4.94)

## 2023-07-10 PROCEDURE — 84443 ASSAY THYROID STIM HORMONE: CPT

## 2023-07-10 PROCEDURE — 36415 COLL VENOUS BLD VENIPUNCTURE: CPT

## 2023-07-10 PROCEDURE — 84439 ASSAY OF FREE THYROXINE: CPT

## 2023-07-12 DIAGNOSIS — O09.513 AMA (ADVANCED MATERNAL AGE) PRIMIGRAVIDA 35+, THIRD TRIMESTER: ICD-10-CM

## 2023-07-12 DIAGNOSIS — O99.283 HYPOTHYROIDISM AFFECTING PREGNANCY IN THIRD TRIMESTER: ICD-10-CM

## 2023-07-12 DIAGNOSIS — O44.42 LOW LYING PLACENTA NOS OR WITHOUT HEMORRHAGE, SECOND TRIMESTER: Primary | ICD-10-CM

## 2023-07-12 DIAGNOSIS — E03.9 HYPOTHYROIDISM AFFECTING PREGNANCY IN THIRD TRIMESTER: ICD-10-CM

## 2023-07-13 ENCOUNTER — OFFICE VISIT (OUTPATIENT)
Dept: MATERNAL FETAL MEDICINE | Facility: CLINIC | Age: 38
End: 2023-07-13
Payer: COMMERCIAL

## 2023-07-13 ENCOUNTER — PROCEDURE VISIT (OUTPATIENT)
Dept: MATERNAL FETAL MEDICINE | Facility: CLINIC | Age: 38
End: 2023-07-13
Payer: COMMERCIAL

## 2023-07-13 VITALS
SYSTOLIC BLOOD PRESSURE: 128 MMHG | BODY MASS INDEX: 31.58 KG/M2 | HEART RATE: 100 BPM | DIASTOLIC BLOOD PRESSURE: 79 MMHG | WEIGHT: 201.63 LBS

## 2023-07-13 DIAGNOSIS — O44.43 LOW LYING PLACENTA NOS OR WITHOUT HEMORRHAGE, THIRD TRIMESTER: ICD-10-CM

## 2023-07-13 DIAGNOSIS — O09.513 AMA (ADVANCED MATERNAL AGE) PRIMIGRAVIDA 35+, THIRD TRIMESTER: ICD-10-CM

## 2023-07-13 DIAGNOSIS — O99.283 HYPOTHYROIDISM AFFECTING PREGNANCY IN THIRD TRIMESTER: ICD-10-CM

## 2023-07-13 DIAGNOSIS — E03.9 HYPOTHYROIDISM AFFECTING PREGNANCY IN THIRD TRIMESTER: ICD-10-CM

## 2023-07-13 DIAGNOSIS — O09.513 PRIMIGRAVIDA OF ADVANCED MATERNAL AGE IN THIRD TRIMESTER: ICD-10-CM

## 2023-07-13 PROBLEM — O44.42 LOW LYING PLACENTA NOS OR WITHOUT HEMORRHAGE, SECOND TRIMESTER: Status: RESOLVED | Noted: 2023-05-01 | Resolved: 2023-07-13

## 2023-07-13 PROCEDURE — 3074F SYST BP LT 130 MM HG: CPT | Mod: CPTII,S$GLB,, | Performed by: OBSTETRICS & GYNECOLOGY

## 2023-07-13 PROCEDURE — 76816 OB US FOLLOW-UP PER FETUS: CPT | Mod: S$GLB,,, | Performed by: OBSTETRICS & GYNECOLOGY

## 2023-07-13 PROCEDURE — 99214 OFFICE O/P EST MOD 30 MIN: CPT | Mod: 25,S$GLB,, | Performed by: OBSTETRICS & GYNECOLOGY

## 2023-07-13 PROCEDURE — 99214 PR OFFICE/OUTPT VISIT, EST, LEVL IV, 30-39 MIN: ICD-10-PCS | Mod: 25,S$GLB,, | Performed by: OBSTETRICS & GYNECOLOGY

## 2023-07-13 PROCEDURE — 1160F PR REVIEW ALL MEDS BY PRESCRIBER/CLIN PHARMACIST DOCUMENTED: ICD-10-PCS | Mod: CPTII,S$GLB,, | Performed by: OBSTETRICS & GYNECOLOGY

## 2023-07-13 PROCEDURE — 1160F RVW MEDS BY RX/DR IN RCRD: CPT | Mod: CPTII,S$GLB,, | Performed by: OBSTETRICS & GYNECOLOGY

## 2023-07-13 PROCEDURE — 1159F PR MEDICATION LIST DOCUMENTED IN MEDICAL RECORD: ICD-10-PCS | Mod: CPTII,S$GLB,, | Performed by: OBSTETRICS & GYNECOLOGY

## 2023-07-13 PROCEDURE — 76816 PR  US,PREGNANT UTERUS,F/U,TRANSABD APP: ICD-10-PCS | Mod: S$GLB,,, | Performed by: OBSTETRICS & GYNECOLOGY

## 2023-07-13 PROCEDURE — 3078F DIAST BP <80 MM HG: CPT | Mod: CPTII,S$GLB,, | Performed by: OBSTETRICS & GYNECOLOGY

## 2023-07-13 PROCEDURE — 3008F PR BODY MASS INDEX (BMI) DOCUMENTED: ICD-10-PCS | Mod: CPTII,S$GLB,, | Performed by: OBSTETRICS & GYNECOLOGY

## 2023-07-13 PROCEDURE — 3078F PR MOST RECENT DIASTOLIC BLOOD PRESSURE < 80 MM HG: ICD-10-PCS | Mod: CPTII,S$GLB,, | Performed by: OBSTETRICS & GYNECOLOGY

## 2023-07-13 PROCEDURE — 76817 PR US, OB, TRANSVAG APPROACH: ICD-10-PCS | Mod: S$GLB,,, | Performed by: OBSTETRICS & GYNECOLOGY

## 2023-07-13 PROCEDURE — 3008F BODY MASS INDEX DOCD: CPT | Mod: CPTII,S$GLB,, | Performed by: OBSTETRICS & GYNECOLOGY

## 2023-07-13 PROCEDURE — 1159F MED LIST DOCD IN RCRD: CPT | Mod: CPTII,S$GLB,, | Performed by: OBSTETRICS & GYNECOLOGY

## 2023-07-13 PROCEDURE — 76817 TRANSVAGINAL US OBSTETRIC: CPT | Mod: S$GLB,,, | Performed by: OBSTETRICS & GYNECOLOGY

## 2023-07-13 PROCEDURE — 3074F PR MOST RECENT SYSTOLIC BLOOD PRESSURE < 130 MM HG: ICD-10-PCS | Mod: CPTII,S$GLB,, | Performed by: OBSTETRICS & GYNECOLOGY

## 2023-07-13 NOTE — ASSESSMENT & PLAN NOTE
Posterior placenta measures 1.5 cm away from the internal os (transabdominally).    No transvaginal scan was completed today  She has not had any bleeding or abruption symptoms   A low-lying placenta is a normal variant in pregnancy at the mid gestation.  This is expected to resolve spontaneously in most circumstances and there is no need to alter pregnancy management or maternal activities.   - TA ultrasound - placenta appears to have cleared from os.  23- Previous TA ultrasound showed convincing placental view with no low-lying position, however today views were concerning and TV was completed.  With TVU, placenta appears to be right lateral/posterior with placental tail in very close proximity to internal os. We have discussed this finding with her. She understands if it remains in this position, she will require  for delivery.  We discussed the requirements for safe vaginal delivery with low-lying placenta.  The most accepted range in position is 2 cm away from the internal os, however several studies have supported vaginal delivery when the placenta is greater than 1 cm away.  We will consider vaginal birth if the placenta is greater than 1 cm from the internal os at 36 weeks.

## 2023-07-13 NOTE — ASSESSMENT & PLAN NOTE
She has a history of Hashimoto's, thyroid nodules, and a goiter (airway obstruction). She had a thyroidectomy 10 years ago and has been on replacement. In October, her dosage was decreased due to low TSH. Dosage decreased, again, in November for persistently low TSH. She remained on Levothyroxine 137mcg due to stable TSH until recently. She repeated labs in the end of March which revealed a TSH level of 11. She was instructed by her internal med team to take two 137mcg tablets twice weekly on Thursdays and Sundays, and take 1 tablet the rest of the days of the week.  She recently had an outpatient consult with an endocrinologist who agreed with current plan of care.    Thyroid requirements increase in pregnancy due to increases in metabolism and thyroid binding globulin. Thyroid function tests should be followed closely to ensure adequate treatment.  Hypothyroidism has been associated with higher pregnancy complication rates including miscarriage, preeclampsia, abruption, low birth weight and stillbirth. Untreated hypothyroidism has also been associated with adverse fetal neurological development, but well treated hypothyroidism (i.e., euthyroid state) carries an excellent prognosis for mother and baby.      4/25/23- TSH normal. Dosage kept the same (137mcg daily with the exception of Thursdays and Sundays where she takes a double dosage)  5/29/23- Labs from last week reviewed. No changes.   7/10/23- TSH 0.975, FT4 0.90-- she is asymptomatic and feeling well.  These values are within normal range.  She has generally trended down on her TSH for the last few months.  We recommended no change in dose, however we will continue to check monthly as she may need dose adjustment in the future.    Recommendations:  Medication management:   Continue Synthroid at the current dose of 137 micrograms/day (274mcg 2days/week, 137mcg 5days/week) until endocrinology consult and further recs. She has been referred by her PCP and is  anticipating appointment in the next 2 weeks.   Up to 1/3 of women may require increased hormone replacement in the first trimester, thus consider an empiric 25% increase in medication dose at pregnancy confirmation, while discharging postpartum patients on their pre-pregnancy dose.  Laboratory evaluation:   TSH should be monitored at least every trimester, or every 4 weeks after any medication adjustment. Adjust treatment as necessary to maintain TSH goal < 2.5 mIU/L.  · We will monitor fetal growth q6 weeks due to increased risk of growth restriction.

## 2023-07-13 NOTE — PROGRESS NOTES
Maternal Fetal Medicine follow up consult    SUBJECTIVE:     Berna Miranda is a 37 y.o.  female with IUP at 28w5d who is seen in follow up consultation by MFM.  Pregnancy complications include:   Problem   3. Low lying placenta nos or without hemorrhage, third trimester   2. Hypothyroidism affecting pregnancy in third trimester   1. Primigravida of advanced maternal age in third trimester     Berna is doing well. Recently had TFTs. She continues to take Synthroid 137mcg daily with double dosage every Thursday and .   Passed 1hr glucola.  Denies LOF, VB, contractions. Reports positive fetal movement.      Previous notes reviewed.   No changes to medical, surgical, family, social, or obstetric history.    Interval history since last MFM visit: no changes    Medications:  Current Outpatient Medications   Medication Instructions    aspirin (ECOTRIN) 81 mg, Oral, Daily    famotidine (PEPCID) 20 mg, Oral, Daily    levothyroxine (SYNTHROID) 137 mcg, Oral, Before breakfast    ondansetron (ZOFRAN-ODT) 8 MG TbDL DISSOLVE 1 TABLET ON YOUR TONGUE EVERY EIGHT TO TWELVE HOURS FOR NAUSEA OR VOMITING    prenatal vit no.124/iron/folic (PRENATAL VITAMIN ORAL) Oral       Care team members:  Dr FLORA Bloom - Primary OB       OBJECTIVE:   /79 (BP Location: Right arm)   Pulse 100   Wt 91.4 kg (201 lb 9.8 oz)   BMI 31.58 kg/m²     Ultrasound performed. See viewpoint for full ultrasound report.    A viable garcia pregnancy is visualized in cephalic presentation.  Estimated fetal weight is at the 49th percentile with an abdominal circumference at the 58th percentile.    No fetal abnormalities are noted and previous anatomic survey was normal. Amniotic fluid volume is normal.  Placenta is left lateral/ posterior/ low lying (3mm away from os). Biophysical profile Is 8/8.     ASSESSMENT/PLAN:     37 y.o.  female with IUP at 28w5d    1. Primigravida of advanced maternal age in third trimester  We have reviewed the  relationship between maternal age and fetal aneuploidy as well as the association between advanced maternal age (AMA) and preeclampsia, gestational diabetes, and fetal growth restriction.      She completed a NIPT with low risk results.    Recommendations:  Detailed anatomic survey at 19-20 weeks  (complete)  Follow-up fetal ultrasound at 32-34 weeks for interval fetal growth  Initiate low dose aspirin at 12-16 weeks for preeclampsia risk reduction        2. Hypothyroidism affecting pregnancy in third trimester  She has a history of Hashimoto's, thyroid nodules, and a goiter (airway obstruction). She had a thyroidectomy 10 years ago and has been on replacement. In October, her dosage was decreased due to low TSH. Dosage decreased, again, in November for persistently low TSH. She remained on Levothyroxine 137mcg due to stable TSH until recently. She repeated labs in the end of March which revealed a TSH level of 11. She was instructed by her internal med team to take two 137mcg tablets twice weekly on Thursdays and Sundays, and take 1 tablet the rest of the days of the week.  She recently had an outpatient consult with an endocrinologist who agreed with current plan of care.    Thyroid requirements increase in pregnancy due to increases in metabolism and thyroid binding globulin. Thyroid function tests should be followed closely to ensure adequate treatment.  Hypothyroidism has been associated with higher pregnancy complication rates including miscarriage, preeclampsia, abruption, low birth weight and stillbirth. Untreated hypothyroidism has also been associated with adverse fetal neurological development, but well treated hypothyroidism (i.e., euthyroid state) carries an excellent prognosis for mother and baby.      4/25/23- TSH normal. Dosage kept the same (137mcg daily with the exception of Thursdays and Sundays where she takes a double dosage)  5/29/23- Labs from last week reviewed. No changes.   7/10/23- TSH  0.975, FT4 0.90-- she is asymptomatic and feeling well.  These values are within normal range.  She has generally trended down on her TSH for the last few months.  We recommended no change in dose, however we will continue to check monthly as she may need dose adjustment in the future.    Recommendations:  Medication management:  Continue Synthroid at the current dose of 137 micrograms/day (274mcg 2days/week, 137mcg 5days/week) until endocrinology consult and further recs. She has been referred by her PCP and is anticipating appointment in the next 2 weeks.  Up to 1/3 of women may require increased hormone replacement in the first trimester, thus consider an empiric 25% increase in medication dose at pregnancy confirmation, while discharging postpartum patients on their pre-pregnancy dose.  Laboratory evaluation:   TSH should be monitored at least every trimester, or every 4 weeks after any medication adjustment. Adjust treatment as necessary to maintain TSH goal < 2.5 mIU/L.  We will monitor fetal growth q6 weeks due to increased risk of growth restriction.       3. Low lying placenta nos or without hemorrhage, third trimester  Posterior placenta measures 1.5 cm away from the internal os (transabdominally).    No transvaginal scan was completed today  She has not had any bleeding or abruption symptoms   A low-lying placenta is a normal variant in pregnancy at the mid gestation.  This is expected to resolve spontaneously in most circumstances and there is no need to alter pregnancy management or maternal activities.   5/29- TA ultrasound - placenta appears to have cleared from os.  7/13/23- Previous TA ultrasound showed convincing placental view with no low-lying position, however today views were concerning and TV was completed.  With TVU, placenta appears to be right lateral/posterior with placental tail in very close proximity to internal os. We have discussed this finding with her. She understands if it remains  in this position, she will require  for delivery.  We discussed the requirements for safe vaginal delivery with low-lying placenta.  The most accepted range in position is 2 cm away from the internal os, however several studies have supported vaginal delivery when the placenta is greater than 1 cm away.  We will consider vaginal birth if the placenta is greater than 1 cm from the internal os at 36 weeks.      F/u in 4 weeks for MFM visit/ultrasound    Janice Ramirez MD  Maternal Fetal Medicine

## 2023-08-09 ENCOUNTER — LAB VISIT (OUTPATIENT)
Dept: LAB | Facility: HOSPITAL | Age: 38
End: 2023-08-09
Attending: NURSE PRACTITIONER
Payer: COMMERCIAL

## 2023-08-09 DIAGNOSIS — E03.9 HYPOTHYROIDISM AFFECTING PREGNANCY IN THIRD TRIMESTER: ICD-10-CM

## 2023-08-09 DIAGNOSIS — O99.283 HYPOTHYROIDISM AFFECTING PREGNANCY IN THIRD TRIMESTER: ICD-10-CM

## 2023-08-09 LAB
T4 FREE SERPL-MCNC: 1.03 NG/DL (ref 0.7–1.48)
TSH SERPL-ACNC: 1.4 UIU/ML (ref 0.35–4.94)

## 2023-08-09 PROCEDURE — 84439 ASSAY OF FREE THYROXINE: CPT

## 2023-08-09 PROCEDURE — 36415 COLL VENOUS BLD VENIPUNCTURE: CPT

## 2023-08-09 PROCEDURE — 84443 ASSAY THYROID STIM HORMONE: CPT

## 2023-08-15 DIAGNOSIS — O44.43 LOW LYING PLACENTA NOS OR WITHOUT HEMORRHAGE, THIRD TRIMESTER: ICD-10-CM

## 2023-08-15 DIAGNOSIS — O09.513 PRIMIGRAVIDA OF ADVANCED MATERNAL AGE IN THIRD TRIMESTER: Primary | ICD-10-CM

## 2023-08-15 DIAGNOSIS — E03.9 HYPOTHYROIDISM AFFECTING PREGNANCY IN THIRD TRIMESTER: ICD-10-CM

## 2023-08-15 DIAGNOSIS — O99.283 HYPOTHYROIDISM AFFECTING PREGNANCY IN THIRD TRIMESTER: ICD-10-CM

## 2023-08-16 ENCOUNTER — PROCEDURE VISIT (OUTPATIENT)
Dept: MATERNAL FETAL MEDICINE | Facility: CLINIC | Age: 38
End: 2023-08-16
Payer: COMMERCIAL

## 2023-08-16 ENCOUNTER — OFFICE VISIT (OUTPATIENT)
Dept: MATERNAL FETAL MEDICINE | Facility: CLINIC | Age: 38
End: 2023-08-16
Payer: COMMERCIAL

## 2023-08-16 VITALS
BODY MASS INDEX: 31.68 KG/M2 | HEART RATE: 87 BPM | SYSTOLIC BLOOD PRESSURE: 107 MMHG | DIASTOLIC BLOOD PRESSURE: 67 MMHG | WEIGHT: 202.25 LBS

## 2023-08-16 DIAGNOSIS — O44.43 LOW LYING PLACENTA NOS OR WITHOUT HEMORRHAGE, THIRD TRIMESTER: ICD-10-CM

## 2023-08-16 DIAGNOSIS — O99.283 HYPOTHYROIDISM AFFECTING PREGNANCY IN THIRD TRIMESTER: ICD-10-CM

## 2023-08-16 DIAGNOSIS — O09.513 PRIMIGRAVIDA OF ADVANCED MATERNAL AGE IN THIRD TRIMESTER: ICD-10-CM

## 2023-08-16 DIAGNOSIS — E03.9 HYPOTHYROIDISM AFFECTING PREGNANCY IN THIRD TRIMESTER: ICD-10-CM

## 2023-08-16 PROCEDURE — 1160F RVW MEDS BY RX/DR IN RCRD: CPT | Mod: CPTII,S$GLB,, | Performed by: OBSTETRICS & GYNECOLOGY

## 2023-08-16 PROCEDURE — 76819 PR US, OB, FETAL BIOPHYSICAL, W/O NST: ICD-10-PCS | Mod: S$GLB,,, | Performed by: OBSTETRICS & GYNECOLOGY

## 2023-08-16 PROCEDURE — 3008F BODY MASS INDEX DOCD: CPT | Mod: CPTII,S$GLB,, | Performed by: OBSTETRICS & GYNECOLOGY

## 2023-08-16 PROCEDURE — 3078F PR MOST RECENT DIASTOLIC BLOOD PRESSURE < 80 MM HG: ICD-10-PCS | Mod: CPTII,S$GLB,, | Performed by: OBSTETRICS & GYNECOLOGY

## 2023-08-16 PROCEDURE — 99213 PR OFFICE/OUTPT VISIT, EST, LEVL III, 20-29 MIN: ICD-10-PCS | Mod: 25,S$GLB,, | Performed by: OBSTETRICS & GYNECOLOGY

## 2023-08-16 PROCEDURE — 76819 FETAL BIOPHYS PROFIL W/O NST: CPT | Mod: S$GLB,,, | Performed by: OBSTETRICS & GYNECOLOGY

## 2023-08-16 PROCEDURE — 1159F MED LIST DOCD IN RCRD: CPT | Mod: CPTII,S$GLB,, | Performed by: OBSTETRICS & GYNECOLOGY

## 2023-08-16 PROCEDURE — 3008F PR BODY MASS INDEX (BMI) DOCUMENTED: ICD-10-PCS | Mod: CPTII,S$GLB,, | Performed by: OBSTETRICS & GYNECOLOGY

## 2023-08-16 PROCEDURE — 76816 PR  US,PREGNANT UTERUS,F/U,TRANSABD APP: ICD-10-PCS | Mod: S$GLB,,, | Performed by: OBSTETRICS & GYNECOLOGY

## 2023-08-16 PROCEDURE — 99213 OFFICE O/P EST LOW 20 MIN: CPT | Mod: 25,S$GLB,, | Performed by: OBSTETRICS & GYNECOLOGY

## 2023-08-16 PROCEDURE — 3074F SYST BP LT 130 MM HG: CPT | Mod: CPTII,S$GLB,, | Performed by: OBSTETRICS & GYNECOLOGY

## 2023-08-16 PROCEDURE — 76816 OB US FOLLOW-UP PER FETUS: CPT | Mod: S$GLB,,, | Performed by: OBSTETRICS & GYNECOLOGY

## 2023-08-16 PROCEDURE — 3074F PR MOST RECENT SYSTOLIC BLOOD PRESSURE < 130 MM HG: ICD-10-PCS | Mod: CPTII,S$GLB,, | Performed by: OBSTETRICS & GYNECOLOGY

## 2023-08-16 PROCEDURE — 3078F DIAST BP <80 MM HG: CPT | Mod: CPTII,S$GLB,, | Performed by: OBSTETRICS & GYNECOLOGY

## 2023-08-16 PROCEDURE — 1159F PR MEDICATION LIST DOCUMENTED IN MEDICAL RECORD: ICD-10-PCS | Mod: CPTII,S$GLB,, | Performed by: OBSTETRICS & GYNECOLOGY

## 2023-08-16 PROCEDURE — 1160F PR REVIEW ALL MEDS BY PRESCRIBER/CLIN PHARMACIST DOCUMENTED: ICD-10-PCS | Mod: CPTII,S$GLB,, | Performed by: OBSTETRICS & GYNECOLOGY

## 2023-08-16 NOTE — PROGRESS NOTES
Maternal Fetal Medicine follow up consult    SUBJECTIVE:     Berna Miranda is a 37 y.o.  female with IUP at 33w4d who is seen in follow up consultation by MFM.  Pregnancy complications include:   Problem   2. Hypothyroidism affecting pregnancy in third trimester   1. Primigravida of advanced maternal age in third trimester     Berna is doing well. She recently had TFTs evaluated which were normal.   She has had low lying placenta this pregnancy and has TVU scheduled at 36 weeks for reassessment as she wants vaginal birth (only 3mm away).   Denies LOF, VB, contractions. Positive fetal movement.    Previous notes reviewed.   No changes to medical, surgical, family, social, or obstetric history.    Interval history since last MFM visit: no changes    Medications:  Current Outpatient Medications   Medication Instructions    aspirin (ECOTRIN) 81 mg, Oral, Daily    famotidine (PEPCID) 20 mg, Oral, Daily    levothyroxine (SYNTHROID) 137 mcg, Oral, Before breakfast    ondansetron (ZOFRAN-ODT) 8 MG TbDL DISSOLVE 1 TABLET ON YOUR TONGUE EVERY EIGHT TO TWELVE HOURS FOR NAUSEA OR VOMITING    prenatal vit no.124/iron/folic (PRENATAL VITAMIN ORAL) Oral       Care team members:  Dr FLORA Bloom - Primary OB       OBJECTIVE:   /67 (BP Location: Right arm)   Pulse 87   Wt 91.7 kg (202 lb 4.4 oz)   BMI 31.68 kg/m²     Ultrasound performed. See viewpoint for full ultrasound report.    A viable garcia pregnancy is visualized in cephalic presentation.  Estimated fetal weight is at the 25th percentile with an abdominal circumference at the 53rd percentile.    No fetal abnormalities are noted and previous anatomic survey was normal. Amniotic fluid volume is normal.  Placenta is left, lateral and low lying (on last TVU scan). Biophysical profile Is .     ASSESSMENT/PLAN:     37 y.o.  female with IUP at 33w4d    1. Primigravida of advanced maternal age in third trimester  We have reviewed the relationship between  maternal age and fetal aneuploidy as well as the association between advanced maternal age (AMA) and preeclampsia, gestational diabetes, and fetal growth restriction.      She completed a NIPT with low risk results.    Recommendations:  Detailed anatomic survey at 19-20 weeks  (complete)  Follow-up fetal ultrasound at 32-34 weeks for interval fetal growth  Initiate low dose aspirin at 12-16 weeks for preeclampsia risk reduction        2. Hypothyroidism affecting pregnancy in third trimester  She has a history of Hashimoto's, thyroid nodules, and a goiter (airway obstruction). She had a thyroidectomy 10 years ago and has been on replacement. In October, her dosage was decreased due to low TSH. Dosage decreased, again, in November for persistently low TSH. She remained on Levothyroxine 137mcg due to stable TSH until recently. She repeated labs in the end of March which revealed a TSH level of 11. She was instructed by her internal med team to take two 137mcg tablets twice weekly on Thursdays and Sundays, and take 1 tablet the rest of the days of the week.  She recently had an outpatient consult with an endocrinologist who agreed with current plan of care.    Thyroid requirements increase in pregnancy due to increases in metabolism and thyroid binding globulin. Thyroid function tests should be followed closely to ensure adequate treatment.  Hypothyroidism has been associated with higher pregnancy complication rates including miscarriage, preeclampsia, abruption, low birth weight and stillbirth. Untreated hypothyroidism has also been associated with adverse fetal neurological development, but well treated hypothyroidism (i.e., euthyroid state) carries an excellent prognosis for mother and baby.      4/25/23- TSH normal. Dosage kept the same (137mcg daily with the exception of Thursdays and Sundays where she takes a double dosage)  5/29/23- Labs from last week reviewed. No changes.   7/10/23- TSH 0.975, FT4 0.90-- she is  asymptomatic and feeling well.  These values are within normal range.  She has generally trended down on her TSH for the last few months.  We recommended no change in dose, however we will continue to check monthly as she may need dose adjustment in the future.  8/9/23- TSH 1.4     Recommendations:  Medication management:  Continue Synthroid at the current dose of 137 micrograms/day (274mcg 2days/week, 137mcg 5days/week) until endocrinology consult and further recs. She has been referred by her PCP and is anticipating appointment in the next 2 weeks.  Up to 1/3 of women may require increased hormone replacement in the first trimester, thus consider an empiric 25% increase in medication dose at pregnancy confirmation, while discharging postpartum patients on their pre-pregnancy dose.  Laboratory evaluation:   TSH should be monitored at least every trimester, or every 4 weeks after any medication adjustment. Adjust treatment as necessary to maintain TSH goal < 2.5 mIU/L.  We will monitor fetal growth q6 weeks due to increased risk of growth restriction.       F/u at 36 weeks for cervical length only to determine placental positioning. No further appointments will be made beyond that.    Janice Ramirez MD  Maternal Fetal Medicine

## 2023-08-16 NOTE — ASSESSMENT & PLAN NOTE
She has a history of Hashimoto's, thyroid nodules, and a goiter (airway obstruction). She had a thyroidectomy 10 years ago and has been on replacement. In October, her dosage was decreased due to low TSH. Dosage decreased, again, in November for persistently low TSH. She remained on Levothyroxine 137mcg due to stable TSH until recently. She repeated labs in the end of March which revealed a TSH level of 11. She was instructed by her internal med team to take two 137mcg tablets twice weekly on Thursdays and Sundays, and take 1 tablet the rest of the days of the week.  She recently had an outpatient consult with an endocrinologist who agreed with current plan of care.    Thyroid requirements increase in pregnancy due to increases in metabolism and thyroid binding globulin. Thyroid function tests should be followed closely to ensure adequate treatment.  Hypothyroidism has been associated with higher pregnancy complication rates including miscarriage, preeclampsia, abruption, low birth weight and stillbirth. Untreated hypothyroidism has also been associated with adverse fetal neurological development, but well treated hypothyroidism (i.e., euthyroid state) carries an excellent prognosis for mother and baby.      4/25/23- TSH normal. Dosage kept the same (137mcg daily with the exception of Thursdays and Sundays where she takes a double dosage)  5/29/23- Labs from last week reviewed. No changes.   7/10/23- TSH 0.975, FT4 0.90-- she is asymptomatic and feeling well.  These values are within normal range.  She has generally trended down on her TSH for the last few months.  We recommended no change in dose, however we will continue to check monthly as she may need dose adjustment in the future.  8/9/23- TSH 1.4     Recommendations:  Medication management:   Continue Synthroid at the current dose of 137 micrograms/day (274mcg 2days/week, 137mcg 5days/week) until endocrinology consult and further recs. She has been referred  by her PCP and is anticipating appointment in the next 2 weeks.   Up to 1/3 of women may require increased hormone replacement in the first trimester, thus consider an empiric 25% increase in medication dose at pregnancy confirmation, while discharging postpartum patients on their pre-pregnancy dose.  Laboratory evaluation:   TSH should be monitored at least every trimester, or every 4 weeks after any medication adjustment. Adjust treatment as necessary to maintain TSH goal < 2.5 mIU/L.  · We will monitor fetal growth q6 weeks due to increased risk of growth restriction.

## 2023-08-21 DIAGNOSIS — E03.9 HYPOTHYROIDISM, UNSPECIFIED TYPE: ICD-10-CM

## 2023-08-21 RX ORDER — LEVOTHYROXINE SODIUM 137 UG/1
137 TABLET ORAL
Qty: 30 TABLET | Refills: 11 | Status: SHIPPED | OUTPATIENT
Start: 2023-08-21 | End: 2023-09-10 | Stop reason: SDUPTHER

## 2023-08-24 DIAGNOSIS — O44.43 LOW LYING PLACENTA NOS OR WITHOUT HEMORRHAGE, THIRD TRIMESTER: ICD-10-CM

## 2023-08-24 DIAGNOSIS — O09.513 AMA (ADVANCED MATERNAL AGE) PRIMIGRAVIDA 35+, THIRD TRIMESTER: ICD-10-CM

## 2023-08-24 DIAGNOSIS — O99.283 HYPOTHYROIDISM AFFECTING PREGNANCY IN THIRD TRIMESTER: ICD-10-CM

## 2023-08-24 DIAGNOSIS — O09.513 PRIMIGRAVIDA OF ADVANCED MATERNAL AGE IN THIRD TRIMESTER: Primary | ICD-10-CM

## 2023-08-24 DIAGNOSIS — E03.9 HYPOTHYROIDISM AFFECTING PREGNANCY IN THIRD TRIMESTER: ICD-10-CM

## 2023-09-05 ENCOUNTER — OFFICE VISIT (OUTPATIENT)
Dept: MATERNAL FETAL MEDICINE | Facility: CLINIC | Age: 38
End: 2023-09-05
Payer: COMMERCIAL

## 2023-09-05 ENCOUNTER — PROCEDURE VISIT (OUTPATIENT)
Dept: MATERNAL FETAL MEDICINE | Facility: CLINIC | Age: 38
End: 2023-09-05
Payer: COMMERCIAL

## 2023-09-05 VITALS
DIASTOLIC BLOOD PRESSURE: 72 MMHG | HEIGHT: 67 IN | BODY MASS INDEX: 32.35 KG/M2 | SYSTOLIC BLOOD PRESSURE: 116 MMHG | HEART RATE: 87 BPM | RESPIRATION RATE: 18 BRPM | WEIGHT: 206.13 LBS

## 2023-09-05 DIAGNOSIS — O44.43 LOW LYING PLACENTA NOS OR WITHOUT HEMORRHAGE, THIRD TRIMESTER: ICD-10-CM

## 2023-09-05 DIAGNOSIS — O09.513 AMA (ADVANCED MATERNAL AGE) PRIMIGRAVIDA 35+, THIRD TRIMESTER: ICD-10-CM

## 2023-09-05 DIAGNOSIS — O99.283 HYPOTHYROIDISM AFFECTING PREGNANCY IN THIRD TRIMESTER: ICD-10-CM

## 2023-09-05 DIAGNOSIS — E03.9 HYPOTHYROIDISM AFFECTING PREGNANCY IN THIRD TRIMESTER: ICD-10-CM

## 2023-09-05 DIAGNOSIS — O09.513 PRIMIGRAVIDA OF ADVANCED MATERNAL AGE IN THIRD TRIMESTER: ICD-10-CM

## 2023-09-05 LAB — PRENATAL STREP B CULTURE: POSITIVE

## 2023-09-05 PROCEDURE — 1160F RVW MEDS BY RX/DR IN RCRD: CPT | Mod: CPTII,S$GLB,, | Performed by: OBSTETRICS & GYNECOLOGY

## 2023-09-05 PROCEDURE — 76819 FETAL BIOPHYS PROFIL W/O NST: CPT | Mod: S$GLB,,, | Performed by: OBSTETRICS & GYNECOLOGY

## 2023-09-05 PROCEDURE — 3078F DIAST BP <80 MM HG: CPT | Mod: CPTII,S$GLB,, | Performed by: OBSTETRICS & GYNECOLOGY

## 2023-09-05 PROCEDURE — 1159F PR MEDICATION LIST DOCUMENTED IN MEDICAL RECORD: ICD-10-PCS | Mod: CPTII,S$GLB,, | Performed by: OBSTETRICS & GYNECOLOGY

## 2023-09-05 PROCEDURE — 1159F MED LIST DOCD IN RCRD: CPT | Mod: CPTII,S$GLB,, | Performed by: OBSTETRICS & GYNECOLOGY

## 2023-09-05 PROCEDURE — 1160F PR REVIEW ALL MEDS BY PRESCRIBER/CLIN PHARMACIST DOCUMENTED: ICD-10-PCS | Mod: CPTII,S$GLB,, | Performed by: OBSTETRICS & GYNECOLOGY

## 2023-09-05 PROCEDURE — 99213 OFFICE O/P EST LOW 20 MIN: CPT | Mod: 25,S$GLB,, | Performed by: OBSTETRICS & GYNECOLOGY

## 2023-09-05 PROCEDURE — 3008F PR BODY MASS INDEX (BMI) DOCUMENTED: ICD-10-PCS | Mod: CPTII,S$GLB,, | Performed by: OBSTETRICS & GYNECOLOGY

## 2023-09-05 PROCEDURE — 76819 PR US, OB, FETAL BIOPHYSICAL, W/O NST: ICD-10-PCS | Mod: S$GLB,,, | Performed by: OBSTETRICS & GYNECOLOGY

## 2023-09-05 PROCEDURE — 3008F BODY MASS INDEX DOCD: CPT | Mod: CPTII,S$GLB,, | Performed by: OBSTETRICS & GYNECOLOGY

## 2023-09-05 PROCEDURE — 99213 PR OFFICE/OUTPT VISIT, EST, LEVL III, 20-29 MIN: ICD-10-PCS | Mod: 25,S$GLB,, | Performed by: OBSTETRICS & GYNECOLOGY

## 2023-09-05 PROCEDURE — 76817 TRANSVAGINAL US OBSTETRIC: CPT | Mod: S$GLB,,, | Performed by: OBSTETRICS & GYNECOLOGY

## 2023-09-05 PROCEDURE — 3078F PR MOST RECENT DIASTOLIC BLOOD PRESSURE < 80 MM HG: ICD-10-PCS | Mod: CPTII,S$GLB,, | Performed by: OBSTETRICS & GYNECOLOGY

## 2023-09-05 PROCEDURE — 76817 PR US, OB, TRANSVAG APPROACH: ICD-10-PCS | Mod: S$GLB,,, | Performed by: OBSTETRICS & GYNECOLOGY

## 2023-09-05 PROCEDURE — 3074F PR MOST RECENT SYSTOLIC BLOOD PRESSURE < 130 MM HG: ICD-10-PCS | Mod: CPTII,S$GLB,, | Performed by: OBSTETRICS & GYNECOLOGY

## 2023-09-05 PROCEDURE — 3074F SYST BP LT 130 MM HG: CPT | Mod: CPTII,S$GLB,, | Performed by: OBSTETRICS & GYNECOLOGY

## 2023-09-05 NOTE — ASSESSMENT & PLAN NOTE
She has not had any bleeding or abruption symptoms   A low-lying placenta is a normal variant in pregnancy at the mid gestation.  This is expected to resolve spontaneously in most circumstances and there is no need to alter pregnancy management or maternal activities.   - TA ultrasound - placenta appears to have cleared from os.  23- Previous TA ultrasound showed convincing placental view with no low-lying position, however today views were concerning and TV was completed.  With TVU, placenta appears to be right lateral/posterior with placental tail in very close proximity to internal os. We have discussed this finding with her. She understands if it remains in this position, she will require  for delivery.  We discussed the requirements for safe vaginal delivery with low-lying placenta.  The most accepted range in position is 2 cm away from the internal os, however several studies have supported vaginal delivery when the placenta is greater than 1 cm away.  We will consider vaginal birth if the placenta is greater than 1 cm from the internal os at 36 weeks.  23- Transvaginal ultrasound performed. Placental edge noted to be >2cm away from internal os.

## 2023-09-05 NOTE — PROGRESS NOTES
"Maternal Fetal Medicine follow up consult    SUBJECTIVE:     Berna Miranda is a 37 y.o.  female with IUP at 36w3d who is seen in follow up consultation by MFM.  Pregnancy complications include:   Problem   Low Lying Placenta NOS Or Without Hemorrhage, Third Trimester     Berna is without complaints. Presents today for placental location assessment.    Previous notes reviewed.   No changes to medical, surgical, family, social, or obstetric history.    Interval history since last MFM visit: no changes    Medications:  Current Outpatient Medications   Medication Instructions    aspirin (ECOTRIN) 81 mg, Oral, Daily    famotidine (PEPCID) 20 mg, Oral, Daily    levothyroxine (SYNTHROID) 137 mcg, Oral, Before breakfast    ondansetron (ZOFRAN-ODT) 8 MG TbDL DISSOLVE 1 TABLET ON YOUR TONGUE EVERY EIGHT TO TWELVE HOURS FOR NAUSEA OR VOMITING    prenatal vit no.124/iron/folic (PRENATAL VITAMIN ORAL) Oral       Care team members:  Dr FLORA Bloom - Primary OB     OBJECTIVE:   /72 (BP Location: Left arm, Patient Position: Sitting, BP Method: Medium (Automatic))   Pulse 87   Resp 18   Ht 5' 7" (1.702 m)   Wt 93.5 kg (206 lb 2.1 oz)   BMI 32.28 kg/m²     Ultrasound performed. See viewpoint for full ultrasound report.    A viable garcia pregnancy is seen in cephalic presentation.   No abnormalities are seen.  Amniotic fluid volume is normal.   Placenta is left, lateral and no longer low-lying (2.4cm away).  Biophysical profile is 8/8.    ASSESSMENT/PLAN:     37 y.o.  female with IUP at 36w3d    Low lying placenta nos or without hemorrhage, third trimester    She has not had any bleeding or abruption symptoms   A low-lying placenta is a normal variant in pregnancy at the mid gestation.  This is expected to resolve spontaneously in most circumstances and there is no need to alter pregnancy management or maternal activities.   - TA ultrasound - placenta appears to have cleared from os.  23- Previous " TA ultrasound showed convincing placental view with no low-lying position, however today views were concerning and TV was completed.  With TVU, placenta appears to be right lateral/posterior with placental tail in very close proximity to internal os. We have discussed this finding with her. She understands if it remains in this position, she will require  for delivery.  We discussed the requirements for safe vaginal delivery with low-lying placenta.  The most accepted range in position is 2 cm away from the internal os, however several studies have supported vaginal delivery when the placenta is greater than 1 cm away.  We will consider vaginal birth if the placenta is greater than 1 cm from the internal os at 36 weeks.  23- Transvaginal ultrasound performed. Placental edge noted to be >2cm away from internal os.       Given her current gestational age, no further appointments have been made at this time.  She will have TSH drawn one more time prior to delivery.    Janice Ramirez MD  Maternal Fetal Medicine

## 2023-09-10 DIAGNOSIS — E03.9 HYPOTHYROIDISM, UNSPECIFIED TYPE: ICD-10-CM

## 2023-09-11 RX ORDER — LEVOTHYROXINE SODIUM 137 UG/1
137 TABLET ORAL
Qty: 30 TABLET | Refills: 11 | Status: SHIPPED | OUTPATIENT
Start: 2023-09-11 | End: 2024-09-10

## 2023-09-14 ENCOUNTER — PATIENT MESSAGE (OUTPATIENT)
Dept: INTERNAL MEDICINE | Facility: CLINIC | Age: 38
End: 2023-09-14
Payer: COMMERCIAL

## 2023-09-14 DIAGNOSIS — E03.9 ACQUIRED HYPOTHYROIDISM: Primary | ICD-10-CM

## 2023-09-15 ENCOUNTER — LAB VISIT (OUTPATIENT)
Dept: LAB | Facility: HOSPITAL | Age: 38
End: 2023-09-15
Attending: NURSE PRACTITIONER
Payer: COMMERCIAL

## 2023-09-15 DIAGNOSIS — E03.9 ACQUIRED HYPOTHYROIDISM: ICD-10-CM

## 2023-09-15 LAB
T4 FREE SERPL-MCNC: 1.01 NG/DL (ref 0.7–1.48)
TSH SERPL-ACNC: 1.28 UIU/ML (ref 0.35–4.94)

## 2023-09-15 PROCEDURE — 36415 COLL VENOUS BLD VENIPUNCTURE: CPT

## 2023-09-15 PROCEDURE — 84443 ASSAY THYROID STIM HORMONE: CPT

## 2023-09-15 PROCEDURE — 84439 ASSAY OF FREE THYROXINE: CPT

## 2023-10-03 ENCOUNTER — ANESTHESIA EVENT (OUTPATIENT)
Dept: OBSTETRICS AND GYNECOLOGY | Facility: HOSPITAL | Age: 38
End: 2023-10-03
Payer: COMMERCIAL

## 2023-10-03 ENCOUNTER — ANESTHESIA (OUTPATIENT)
Dept: OBSTETRICS AND GYNECOLOGY | Facility: HOSPITAL | Age: 38
End: 2023-10-03
Payer: COMMERCIAL

## 2023-10-03 ENCOUNTER — HOSPITAL ENCOUNTER (INPATIENT)
Facility: HOSPITAL | Age: 38
LOS: 2 days | Discharge: HOME | End: 2023-10-05
Attending: OBSTETRICS & GYNECOLOGY | Admitting: OBSTETRICS & GYNECOLOGY
Payer: COMMERCIAL

## 2023-10-03 DIAGNOSIS — Z34.90 ENCOUNTER FOR INDUCTION OF LABOR: ICD-10-CM

## 2023-10-03 DIAGNOSIS — O09.513 PRIMIGRAVIDA OF ADVANCED MATERNAL AGE IN THIRD TRIMESTER: Primary | ICD-10-CM

## 2023-10-03 LAB
BASOPHILS # BLD AUTO: 0.05 X10(3)/MCL
BASOPHILS NFR BLD AUTO: 0.5 %
EOSINOPHIL # BLD AUTO: 0.08 X10(3)/MCL (ref 0–0.9)
EOSINOPHIL NFR BLD AUTO: 0.7 %
ERYTHROCYTE [DISTWIDTH] IN BLOOD BY AUTOMATED COUNT: 13.3 % (ref 11.5–17)
GROUP & RH: NORMAL
HCT VFR BLD AUTO: 33.7 % (ref 37–47)
HGB BLD-MCNC: 10.9 G/DL (ref 12–16)
IMM GRANULOCYTES # BLD AUTO: 0.04 X10(3)/MCL (ref 0–0.04)
IMM GRANULOCYTES NFR BLD AUTO: 0.4 %
INDIRECT COOMBS GEL: NORMAL
LYMPHOCYTES # BLD AUTO: 1.58 X10(3)/MCL (ref 0.6–4.6)
LYMPHOCYTES NFR BLD AUTO: 14.5 %
MCH RBC QN AUTO: 26.5 PG (ref 27–31)
MCHC RBC AUTO-ENTMCNC: 32.3 G/DL (ref 33–36)
MCV RBC AUTO: 81.8 FL (ref 80–94)
MONOCYTES # BLD AUTO: 0.69 X10(3)/MCL (ref 0.1–1.3)
MONOCYTES NFR BLD AUTO: 6.3 %
NEUTROPHILS # BLD AUTO: 8.45 X10(3)/MCL (ref 2.1–9.2)
NEUTROPHILS NFR BLD AUTO: 77.6 %
NRBC BLD AUTO-RTO: 0 %
PLATELET # BLD AUTO: 245 X10(3)/MCL (ref 130–400)
PMV BLD AUTO: 10.2 FL (ref 7.4–10.4)
RBC # BLD AUTO: 4.12 X10(6)/MCL (ref 4.2–5.4)
SPECIMEN OUTDATE: NORMAL
T PALLIDUM AB SER QL: NONREACTIVE
WBC # SPEC AUTO: 10.89 X10(3)/MCL (ref 4.5–11.5)

## 2023-10-03 PROCEDURE — 94761 N-INVAS EAR/PLS OXIMETRY MLT: CPT

## 2023-10-03 PROCEDURE — 25000003 PHARM REV CODE 250: Performed by: NURSE ANESTHETIST, CERTIFIED REGISTERED

## 2023-10-03 PROCEDURE — 85025 COMPLETE CBC W/AUTO DIFF WBC: CPT | Performed by: OBSTETRICS & GYNECOLOGY

## 2023-10-03 PROCEDURE — 86780 TREPONEMA PALLIDUM: CPT | Performed by: OBSTETRICS & GYNECOLOGY

## 2023-10-03 PROCEDURE — 11000001 HC ACUTE MED/SURG PRIVATE ROOM

## 2023-10-03 PROCEDURE — 59409 OBSTETRICAL CARE: CPT | Mod: QZ,,, | Performed by: NURSE ANESTHETIST, CERTIFIED REGISTERED

## 2023-10-03 PROCEDURE — 25000003 PHARM REV CODE 250: Performed by: OBSTETRICS & GYNECOLOGY

## 2023-10-03 PROCEDURE — 63600175 PHARM REV CODE 636 W HCPCS: Performed by: NURSE ANESTHETIST, CERTIFIED REGISTERED

## 2023-10-03 PROCEDURE — 59409 PRA ETRICAL CARE,VAG DELIV ONLY: ICD-10-PCS | Mod: QZ,,, | Performed by: NURSE ANESTHETIST, CERTIFIED REGISTERED

## 2023-10-03 PROCEDURE — 51702 INSERT TEMP BLADDER CATH: CPT

## 2023-10-03 PROCEDURE — 63600175 PHARM REV CODE 636 W HCPCS: Performed by: OBSTETRICS & GYNECOLOGY

## 2023-10-03 PROCEDURE — 62326 NJX INTERLAMINAR LMBR/SAC: CPT | Performed by: NURSE ANESTHETIST, CERTIFIED REGISTERED

## 2023-10-03 PROCEDURE — 86901 BLOOD TYPING SEROLOGIC RH(D): CPT | Performed by: OBSTETRICS & GYNECOLOGY

## 2023-10-03 RX ORDER — LIDOCAINE HYDROCHLORIDE 10 MG/ML
10 INJECTION INFILTRATION; PERINEURAL ONCE AS NEEDED
Status: DISCONTINUED | OUTPATIENT
Start: 2023-10-03 | End: 2023-10-04 | Stop reason: HOSPADM

## 2023-10-03 RX ORDER — DIPHENOXYLATE HYDROCHLORIDE AND ATROPINE SULFATE 2.5; .025 MG/1; MG/1
2 TABLET ORAL EVERY 6 HOURS PRN
Status: DISCONTINUED | OUTPATIENT
Start: 2023-10-03 | End: 2023-10-04 | Stop reason: HOSPADM

## 2023-10-03 RX ORDER — ONDANSETRON 4 MG/1
8 TABLET, ORALLY DISINTEGRATING ORAL EVERY 8 HOURS PRN
Status: DISCONTINUED | OUTPATIENT
Start: 2023-10-03 | End: 2023-10-04 | Stop reason: HOSPADM

## 2023-10-03 RX ORDER — BUPIVACAINE HYDROCHLORIDE 2.5 MG/ML
INJECTION, SOLUTION EPIDURAL; INFILTRATION; INTRACAUDAL
Status: DISCONTINUED | OUTPATIENT
Start: 2023-10-03 | End: 2023-10-04

## 2023-10-03 RX ORDER — OXYTOCIN/RINGER'S LACTATE 30/500 ML
95 PLASTIC BAG, INJECTION (ML) INTRAVENOUS ONCE AS NEEDED
Status: DISCONTINUED | OUTPATIENT
Start: 2023-10-03 | End: 2023-10-04 | Stop reason: SDUPTHER

## 2023-10-03 RX ORDER — METHYLERGONOVINE MALEATE 0.2 MG/ML
200 INJECTION INTRAVENOUS ONCE AS NEEDED
Status: DISCONTINUED | OUTPATIENT
Start: 2023-10-03 | End: 2023-10-04 | Stop reason: SDUPTHER

## 2023-10-03 RX ORDER — SIMETHICONE 80 MG
1 TABLET,CHEWABLE ORAL 4 TIMES DAILY PRN
Status: DISCONTINUED | OUTPATIENT
Start: 2023-10-03 | End: 2023-10-04 | Stop reason: HOSPADM

## 2023-10-03 RX ORDER — SODIUM CHLORIDE, SODIUM LACTATE, POTASSIUM CHLORIDE, CALCIUM CHLORIDE 600; 310; 30; 20 MG/100ML; MG/100ML; MG/100ML; MG/100ML
INJECTION, SOLUTION INTRAVENOUS CONTINUOUS
Status: DISCONTINUED | OUTPATIENT
Start: 2023-10-03 | End: 2023-10-04 | Stop reason: HOSPADM

## 2023-10-03 RX ORDER — OXYTOCIN/RINGER'S LACTATE 30/500 ML
95 PLASTIC BAG, INJECTION (ML) INTRAVENOUS ONCE
Status: DISCONTINUED | OUTPATIENT
Start: 2023-10-03 | End: 2023-10-04 | Stop reason: HOSPADM

## 2023-10-03 RX ORDER — MISOPROSTOL 100 UG/1
800 TABLET ORAL ONCE AS NEEDED
Status: DISCONTINUED | OUTPATIENT
Start: 2023-10-03 | End: 2023-10-04 | Stop reason: SDUPTHER

## 2023-10-03 RX ORDER — FAMOTIDINE 10 MG/ML
20 INJECTION INTRAVENOUS ONCE
Status: DISCONTINUED | OUTPATIENT
Start: 2023-10-04 | End: 2023-10-04 | Stop reason: HOSPADM

## 2023-10-03 RX ORDER — OXYTOCIN/RINGER'S LACTATE 30/500 ML
334 PLASTIC BAG, INJECTION (ML) INTRAVENOUS ONCE AS NEEDED
Status: DISCONTINUED | OUTPATIENT
Start: 2023-10-03 | End: 2023-10-05

## 2023-10-03 RX ORDER — CARBOPROST TROMETHAMINE 250 UG/ML
250 INJECTION, SOLUTION INTRAMUSCULAR
Status: DISCONTINUED | OUTPATIENT
Start: 2023-10-03 | End: 2023-10-04 | Stop reason: SDUPTHER

## 2023-10-03 RX ORDER — FENTANYL/BUPIVACAINE/NS/PF 2-1250MCG
PLASTIC BAG, INJECTION (ML) INJECTION CONTINUOUS
Status: DISCONTINUED | OUTPATIENT
Start: 2023-10-03 | End: 2023-10-04 | Stop reason: HOSPADM

## 2023-10-03 RX ORDER — OXYTOCIN/RINGER'S LACTATE 30/500 ML
0-30 PLASTIC BAG, INJECTION (ML) INTRAVENOUS CONTINUOUS
Status: DISCONTINUED | OUTPATIENT
Start: 2023-10-03 | End: 2023-10-04

## 2023-10-03 RX ORDER — OXYTOCIN/RINGER'S LACTATE 30/500 ML
PLASTIC BAG, INJECTION (ML) INTRAVENOUS
Status: DISCONTINUED
Start: 2023-10-03 | End: 2023-10-03 | Stop reason: WASHOUT

## 2023-10-03 RX ORDER — TERBUTALINE SULFATE 1 MG/ML
0.25 INJECTION SUBCUTANEOUS
Status: DISCONTINUED | OUTPATIENT
Start: 2023-10-03 | End: 2023-10-04 | Stop reason: HOSPADM

## 2023-10-03 RX ORDER — MISOPROSTOL 100 MCG
50 TABLET ORAL ONCE
Status: COMPLETED | OUTPATIENT
Start: 2023-10-03 | End: 2023-10-03

## 2023-10-03 RX ORDER — PROCHLORPERAZINE EDISYLATE 5 MG/ML
5 INJECTION INTRAMUSCULAR; INTRAVENOUS EVERY 6 HOURS PRN
Status: DISCONTINUED | OUTPATIENT
Start: 2023-10-03 | End: 2023-10-04 | Stop reason: HOSPADM

## 2023-10-03 RX ORDER — CALCIUM CARBONATE 200(500)MG
500 TABLET,CHEWABLE ORAL 3 TIMES DAILY PRN
Status: DISCONTINUED | OUTPATIENT
Start: 2023-10-03 | End: 2023-10-04 | Stop reason: HOSPADM

## 2023-10-03 RX ORDER — FENTANYL/BUPIVACAINE/NS/PF 2-1250MCG
PLASTIC BAG, INJECTION (ML) INJECTION CONTINUOUS PRN
Status: DISCONTINUED | OUTPATIENT
Start: 2023-10-03 | End: 2023-10-04

## 2023-10-03 RX ORDER — OXYTOCIN 10 [USP'U]/ML
10 INJECTION, SOLUTION INTRAMUSCULAR; INTRAVENOUS ONCE AS NEEDED
Status: DISCONTINUED | OUTPATIENT
Start: 2023-10-03 | End: 2023-10-04 | Stop reason: SDUPTHER

## 2023-10-03 RX ORDER — OXYTOCIN/RINGER'S LACTATE 30/500 ML
334 PLASTIC BAG, INJECTION (ML) INTRAVENOUS ONCE
Status: DISCONTINUED | OUTPATIENT
Start: 2023-10-03 | End: 2023-10-04 | Stop reason: HOSPADM

## 2023-10-03 RX ORDER — DIPHENHYDRAMINE HYDROCHLORIDE 50 MG/ML
12.5 INJECTION INTRAMUSCULAR; INTRAVENOUS EVERY 4 HOURS PRN
Status: DISCONTINUED | OUTPATIENT
Start: 2023-10-03 | End: 2023-10-04 | Stop reason: HOSPADM

## 2023-10-03 RX ORDER — EPHEDRINE SULFATE 50 MG/ML
10 INJECTION, SOLUTION INTRAVENOUS ONCE AS NEEDED
Status: DISCONTINUED | OUTPATIENT
Start: 2023-10-03 | End: 2023-10-04 | Stop reason: HOSPADM

## 2023-10-03 RX ORDER — ONDANSETRON 2 MG/ML
4 INJECTION INTRAMUSCULAR; INTRAVENOUS ONCE
Status: COMPLETED | OUTPATIENT
Start: 2023-10-04 | End: 2023-10-04

## 2023-10-03 RX ADMIN — SODIUM CHLORIDE, POTASSIUM CHLORIDE, SODIUM LACTATE AND CALCIUM CHLORIDE: 600; 310; 30; 20 INJECTION, SOLUTION INTRAVENOUS at 05:10

## 2023-10-03 RX ADMIN — MISOPROSTOL 50 MCG: 100 TABLET ORAL at 10:10

## 2023-10-03 RX ADMIN — SODIUM CHLORIDE, POTASSIUM CHLORIDE, SODIUM LACTATE AND CALCIUM CHLORIDE: 600; 310; 30; 20 INJECTION, SOLUTION INTRAVENOUS at 09:10

## 2023-10-03 RX ADMIN — Medication 2 MILLI-UNITS/MIN: at 03:10

## 2023-10-03 RX ADMIN — Medication 12 ML/HR: at 07:10

## 2023-10-03 RX ADMIN — BUPIVACAINE HYDROCHLORIDE 8 ML: 2.5 INJECTION, SOLUTION EPIDURAL; INFILTRATION; INTRACAUDAL; PERINEURAL at 07:10

## 2023-10-03 RX ADMIN — AMPICILLIN SODIUM 1 G: 1 INJECTION, POWDER, FOR SOLUTION INTRAMUSCULAR; INTRAVENOUS at 02:10

## 2023-10-03 RX ADMIN — AMPICILLIN SODIUM 2 G: 2 INJECTION, POWDER, FOR SOLUTION INTRAMUSCULAR; INTRAVENOUS at 09:10

## 2023-10-03 RX ADMIN — AMPICILLIN SODIUM 1 G: 1 INJECTION, POWDER, FOR SOLUTION INTRAMUSCULAR; INTRAVENOUS at 09:10

## 2023-10-03 RX ADMIN — AMPICILLIN SODIUM 1 G: 1 INJECTION, POWDER, FOR SOLUTION INTRAMUSCULAR; INTRAVENOUS at 06:10

## 2023-10-04 PROCEDURE — 72100002 HC LABOR CARE, 1ST 8 HOURS

## 2023-10-04 PROCEDURE — 63600175 PHARM REV CODE 636 W HCPCS: Performed by: OBSTETRICS & GYNECOLOGY

## 2023-10-04 PROCEDURE — 72200006 HC VAGINAL DELIVERY LEVEL III

## 2023-10-04 PROCEDURE — 25000003 PHARM REV CODE 250: Performed by: OBSTETRICS & GYNECOLOGY

## 2023-10-04 PROCEDURE — 72100003 HC LABOR CARE, EA. ADDL. 8 HRS

## 2023-10-04 PROCEDURE — 11000001 HC ACUTE MED/SURG PRIVATE ROOM

## 2023-10-04 PROCEDURE — 63600175 PHARM REV CODE 636 W HCPCS: Performed by: ANESTHESIOLOGY

## 2023-10-04 RX ORDER — PROCHLORPERAZINE EDISYLATE 5 MG/ML
5 INJECTION INTRAMUSCULAR; INTRAVENOUS EVERY 6 HOURS PRN
Status: DISCONTINUED | OUTPATIENT
Start: 2023-10-04 | End: 2023-10-05 | Stop reason: HOSPADM

## 2023-10-04 RX ORDER — METHYLERGONOVINE MALEATE 0.2 MG/ML
200 INJECTION INTRAVENOUS ONCE AS NEEDED
Status: DISCONTINUED | OUTPATIENT
Start: 2023-10-04 | End: 2023-10-05 | Stop reason: HOSPADM

## 2023-10-04 RX ORDER — DIPHENOXYLATE HYDROCHLORIDE AND ATROPINE SULFATE 2.5; .025 MG/1; MG/1
2 TABLET ORAL EVERY 6 HOURS PRN
Status: DISCONTINUED | OUTPATIENT
Start: 2023-10-04 | End: 2023-10-05 | Stop reason: HOSPADM

## 2023-10-04 RX ORDER — IBUPROFEN 600 MG/1
600 TABLET ORAL EVERY 6 HOURS
Status: DISCONTINUED | OUTPATIENT
Start: 2023-10-04 | End: 2023-10-05 | Stop reason: HOSPADM

## 2023-10-04 RX ORDER — MISOPROSTOL 100 UG/1
800 TABLET ORAL ONCE AS NEEDED
Status: DISCONTINUED | OUTPATIENT
Start: 2023-10-04 | End: 2023-10-05 | Stop reason: HOSPADM

## 2023-10-04 RX ORDER — SIMETHICONE 80 MG
1 TABLET,CHEWABLE ORAL EVERY 6 HOURS PRN
Status: DISCONTINUED | OUTPATIENT
Start: 2023-10-04 | End: 2023-10-05 | Stop reason: HOSPADM

## 2023-10-04 RX ORDER — SODIUM CHLORIDE 0.9 % (FLUSH) 0.9 %
10 SYRINGE (ML) INJECTION
Status: DISCONTINUED | OUTPATIENT
Start: 2023-10-04 | End: 2023-10-05 | Stop reason: HOSPADM

## 2023-10-04 RX ORDER — CARBOPROST TROMETHAMINE 250 UG/ML
250 INJECTION, SOLUTION INTRAMUSCULAR
Status: DISCONTINUED | OUTPATIENT
Start: 2023-10-04 | End: 2023-10-05 | Stop reason: HOSPADM

## 2023-10-04 RX ORDER — PRENATAL WITH FERROUS FUM AND FOLIC ACID 3080; 920; 120; 400; 22; 1.84; 3; 20; 10; 1; 12; 200; 27; 25; 2 [IU]/1; [IU]/1; MG/1; [IU]/1; MG/1; MG/1; MG/1; MG/1; MG/1; MG/1; UG/1; MG/1; MG/1; MG/1; MG/1
1 TABLET ORAL DAILY
Status: DISCONTINUED | OUTPATIENT
Start: 2023-10-04 | End: 2023-10-05 | Stop reason: HOSPADM

## 2023-10-04 RX ORDER — OXYTOCIN/RINGER'S LACTATE 30/500 ML
95 PLASTIC BAG, INJECTION (ML) INTRAVENOUS ONCE
Status: COMPLETED | OUTPATIENT
Start: 2023-10-04 | End: 2023-10-04

## 2023-10-04 RX ORDER — OXYCODONE AND ACETAMINOPHEN 5; 325 MG/1; MG/1
1 TABLET ORAL EVERY 4 HOURS PRN
Status: DISCONTINUED | OUTPATIENT
Start: 2023-10-04 | End: 2023-10-05 | Stop reason: HOSPADM

## 2023-10-04 RX ORDER — HYDROCORTISONE 25 MG/G
CREAM TOPICAL 3 TIMES DAILY PRN
Status: DISCONTINUED | OUTPATIENT
Start: 2023-10-04 | End: 2023-10-05 | Stop reason: HOSPADM

## 2023-10-04 RX ORDER — IBUPROFEN 600 MG/1
600 TABLET ORAL EVERY 6 HOURS
Status: DISCONTINUED | OUTPATIENT
Start: 2023-10-04 | End: 2023-10-04

## 2023-10-04 RX ORDER — ACETAMINOPHEN 325 MG/1
650 TABLET ORAL EVERY 6 HOURS PRN
Status: DISCONTINUED | OUTPATIENT
Start: 2023-10-04 | End: 2023-10-05 | Stop reason: HOSPADM

## 2023-10-04 RX ORDER — OXYCODONE AND ACETAMINOPHEN 10; 325 MG/1; MG/1
1 TABLET ORAL EVERY 4 HOURS PRN
Status: DISCONTINUED | OUTPATIENT
Start: 2023-10-04 | End: 2023-10-05 | Stop reason: HOSPADM

## 2023-10-04 RX ORDER — DIPHENHYDRAMINE HCL 25 MG
25 CAPSULE ORAL EVERY 4 HOURS PRN
Status: DISCONTINUED | OUTPATIENT
Start: 2023-10-04 | End: 2023-10-05 | Stop reason: HOSPADM

## 2023-10-04 RX ORDER — OXYTOCIN 10 [USP'U]/ML
10 INJECTION, SOLUTION INTRAMUSCULAR; INTRAVENOUS ONCE AS NEEDED
Status: DISCONTINUED | OUTPATIENT
Start: 2023-10-04 | End: 2023-10-05 | Stop reason: HOSPADM

## 2023-10-04 RX ORDER — DOCUSATE SODIUM 100 MG/1
200 CAPSULE, LIQUID FILLED ORAL 2 TIMES DAILY PRN
Status: DISCONTINUED | OUTPATIENT
Start: 2023-10-04 | End: 2023-10-05 | Stop reason: HOSPADM

## 2023-10-04 RX ORDER — ONDANSETRON 4 MG/1
8 TABLET, ORALLY DISINTEGRATING ORAL EVERY 8 HOURS PRN
Status: DISCONTINUED | OUTPATIENT
Start: 2023-10-04 | End: 2023-10-05 | Stop reason: HOSPADM

## 2023-10-04 RX ORDER — DIPHENHYDRAMINE HYDROCHLORIDE 50 MG/ML
25 INJECTION INTRAMUSCULAR; INTRAVENOUS EVERY 4 HOURS PRN
Status: DISCONTINUED | OUTPATIENT
Start: 2023-10-04 | End: 2023-10-05 | Stop reason: HOSPADM

## 2023-10-04 RX ADMIN — ACETAMINOPHEN 650 MG: 325 TABLET, FILM COATED ORAL at 01:10

## 2023-10-04 RX ADMIN — SODIUM CHLORIDE, POTASSIUM CHLORIDE, SODIUM LACTATE AND CALCIUM CHLORIDE: 600; 310; 30; 20 INJECTION, SOLUTION INTRAVENOUS at 01:10

## 2023-10-04 RX ADMIN — SIMETHICONE 80 MG: 80 TABLET, CHEWABLE ORAL at 09:10

## 2023-10-04 RX ADMIN — Medication 95 MILLI-UNITS/MIN: at 10:10

## 2023-10-04 RX ADMIN — AMPICILLIN SODIUM 1 G: 1 INJECTION, POWDER, FOR SOLUTION INTRAMUSCULAR; INTRAVENOUS at 05:10

## 2023-10-04 RX ADMIN — IBUPROFEN 600 MG: 600 TABLET, FILM COATED ORAL at 10:10

## 2023-10-04 RX ADMIN — IBUPROFEN 600 MG: 600 TABLET, FILM COATED ORAL at 04:10

## 2023-10-04 RX ADMIN — OXYCODONE HYDROCHLORIDE AND ACETAMINOPHEN 1 TABLET: 5; 325 TABLET ORAL at 08:10

## 2023-10-04 RX ADMIN — ONDANSETRON 4 MG: 2 INJECTION INTRAMUSCULAR; INTRAVENOUS at 12:10

## 2023-10-04 RX ADMIN — Medication: at 10:10

## 2023-10-04 RX ADMIN — AMPICILLIN SODIUM 1 G: 1 INJECTION, POWDER, FOR SOLUTION INTRAMUSCULAR; INTRAVENOUS at 01:10

## 2023-10-04 RX ADMIN — IBUPROFEN 600 MG: 600 TABLET, FILM COATED ORAL at 09:10

## 2023-10-04 RX ADMIN — CALCIUM CARBONATE (ANTACID) CHEW TAB 500 MG 500 MG: 500 CHEW TAB at 12:10

## 2023-10-04 NOTE — ANESTHESIA PREPROCEDURE EVALUATION
10/03/2023  Berna Miranda is a 37 y.o., female.      Pre-op Assessment    I have reviewed the Patient Summary Reports.     I have reviewed the Nursing Notes. I have reviewed the NPO Status.   I have reviewed the Medications.     Review of Systems      Physical Exam  General: Well nourished, Cooperative, Alert and Oriented    Airway:  Mallampati: II   Mouth Opening: Normal  TM Distance: Normal  Tongue: Normal  Neck ROM: Normal ROM    Dental:  Intact        Anesthesia Plan  Type of Anesthesia, risks & benefits discussed:    Anesthesia Type: Epidural  Intra-op Monitoring Plan: Standard ASA Monitors  Post Op Pain Control Plan: multimodal analgesia, PCA and epidural analgesia  Informed Consent: Informed consent signed with the Patient and all parties understand the risks and agree with anesthesia plan.  All questions answered.   ASA Score: 2    Ready For Surgery From Anesthesia Perspective.     .

## 2023-10-04 NOTE — PLAN OF CARE
Problem: Breastfeeding  Goal: Effective Breastfeeding  Outcome: Ongoing, Progressing  Intervention: Promote Effective Breastfeeding  Flowsheets (Taken 10/4/2023 1120)  Breastfeeding Assistance:   feeding cue recognition promoted   feeding on demand promoted   support offered  Parent/Child Attachment Promotion:   cue recognition promoted   skin-to-skin contact encouraged  Intervention: Support Exclusive Breastfeeding Success  Flowsheets (Taken 10/4/2023 1120)  Breastfeeding Support: lactation counseling provided

## 2023-10-04 NOTE — ANESTHESIA POSTPROCEDURE EVALUATION
Anesthesia Post Evaluation    Patient: Berna Miranda    Procedure(s) Performed: * No procedures listed *    Final Anesthesia Type: epidural      Patient location during evaluation: labor & delivery  Patient participation: Yes- Able to Participate  Level of consciousness: awake and alert  Post-procedure vital signs: reviewed and stable  Pain management: adequate  Airway patency: patent  CAMELIA mitigation strategies: Multimodal analgesia  PONV status at discharge: No PONV  Anesthetic complications: no      Cardiovascular status: blood pressure returned to baseline and hemodynamically stable  Respiratory status: unassisted  Hydration status: euvolemic  Follow-up not needed.          Vitals Value Taken Time   /73 10/04/23 1140   Temp 36.9 °C (98.4 °F) 10/04/23 1140   Pulse 87 10/04/23 1140   Resp 18 10/04/23 0515   SpO2 99 % 10/04/23 0917         No case tracking events are documented in the log.      Pain/Elian Score: Pain Rating Prior to Med Admin: 4 (10/4/2023  1:20 PM)

## 2023-10-04 NOTE — ANESTHESIA PROCEDURE NOTES
Epidural    Patient location during procedure: OB   Reason for block: primary anesthetic   Reason for block: labor analgesia requested by patient and obstetrician  Diagnosis: Labor Pain   Start time: 10/3/2023 7:36 PM  Timeout: 10/3/2023 7:32 PM  End time: 10/3/2023 7:41 PM  Surgery related to: Active Labor    Staffing  Performing Provider: Everton Yarbrough CRNA  Authorizing Provider: Donal Weathers MD    Staffing  Performed by: Everton Yarbrough CRNA  Authorized by: Donal Weathers MD        Preanesthetic Checklist  Completed: patient identified, IV checked, site marked, risks and benefits discussed, surgical consent, monitors and equipment checked, pre-op evaluation, timeout performed, anesthesia consent given, hand hygiene performed and patient being monitored  Preparation  Patient position: sitting  Prep: ChloraPrep  Patient monitoring: Pulse Ox and Blood Pressure  Reason for block: primary anesthetic   Epidural  Skin Anesthetic: lidocaine 1%  Skin Wheal: 3 mL  Administration type: continuous  Approach: midline  Interspace: L3-4    Injection technique: ADRIANA saline  Needle and Epidural Catheter  Needle type: Tuohy   Needle gauge: 17  Needle length: 7.0 inches  Needle insertion depth: 5 cm  Catheter type: multi-orifice  Catheter size: 18 G  Catheter at skin depth: 12 cm  Insertion Attempts: 1  Test dose: 3 mL of lidocaine 1.5% with Epi 1-to-200,000  Additional Documentation: incremental injection, no paresthesia on injection, no significant pain on injection, negative aspiration for heme and CSF and no significant complaints from patient  Needle localization: anatomical landmarks  Assessment  Upper dermatomal levels - Left: T10  Right: T10   Dermatomal levels determined by alcohol wipe  Ease of block: easy  Patient's tolerance of the procedure: comfortable throughout block and no complaints  Additional Notes  States comfortable No inadvertent dural puncture with Tuohy.  Dural puncture not performed with  spinal needle

## 2023-10-05 VITALS
RESPIRATION RATE: 20 BRPM | TEMPERATURE: 98 F | OXYGEN SATURATION: 99 % | WEIGHT: 214 LBS | DIASTOLIC BLOOD PRESSURE: 82 MMHG | BODY MASS INDEX: 33.52 KG/M2 | HEART RATE: 89 BPM | SYSTOLIC BLOOD PRESSURE: 122 MMHG

## 2023-10-05 PROBLEM — Z34.90 PREGNANCY: Status: ACTIVE | Noted: 2023-10-05

## 2023-10-05 LAB
BASOPHILS # BLD AUTO: 0.07 X10(3)/MCL
BASOPHILS NFR BLD AUTO: 0.4 %
EOSINOPHIL # BLD AUTO: 0.17 X10(3)/MCL (ref 0–0.9)
EOSINOPHIL NFR BLD AUTO: 1 %
ERYTHROCYTE [DISTWIDTH] IN BLOOD BY AUTOMATED COUNT: 13.6 % (ref 11.5–17)
HCT VFR BLD AUTO: 30.5 % (ref 37–47)
HGB BLD-MCNC: 9.9 G/DL (ref 12–16)
IMM GRANULOCYTES # BLD AUTO: 0.1 X10(3)/MCL (ref 0–0.04)
IMM GRANULOCYTES NFR BLD AUTO: 0.6 %
LYMPHOCYTES # BLD AUTO: 1.69 X10(3)/MCL (ref 0.6–4.6)
LYMPHOCYTES NFR BLD AUTO: 10.2 %
MCH RBC QN AUTO: 26.1 PG (ref 27–31)
MCHC RBC AUTO-ENTMCNC: 32.5 G/DL (ref 33–36)
MCV RBC AUTO: 80.3 FL (ref 80–94)
MONOCYTES # BLD AUTO: 1.06 X10(3)/MCL (ref 0.1–1.3)
MONOCYTES NFR BLD AUTO: 6.4 %
NEUTROPHILS # BLD AUTO: 13.44 X10(3)/MCL (ref 2.1–9.2)
NEUTROPHILS NFR BLD AUTO: 81.4 %
NRBC BLD AUTO-RTO: 0 %
PLATELET # BLD AUTO: 226 X10(3)/MCL (ref 130–400)
PMV BLD AUTO: 10.1 FL (ref 7.4–10.4)
RBC # BLD AUTO: 3.8 X10(6)/MCL (ref 4.2–5.4)
WBC # SPEC AUTO: 16.53 X10(3)/MCL (ref 4.5–11.5)

## 2023-10-05 PROCEDURE — 51702 INSERT TEMP BLADDER CATH: CPT

## 2023-10-05 PROCEDURE — 85025 COMPLETE CBC W/AUTO DIFF WBC: CPT | Performed by: OBSTETRICS & GYNECOLOGY

## 2023-10-05 PROCEDURE — 25000003 PHARM REV CODE 250: Performed by: OBSTETRICS & GYNECOLOGY

## 2023-10-05 RX ADMIN — IBUPROFEN 600 MG: 600 TABLET, FILM COATED ORAL at 04:10

## 2023-10-05 RX ADMIN — PRENATAL VITAMINS-IRON FUMARATE 27 MG IRON-FOLIC ACID 0.8 MG TABLET 1 TABLET: at 11:10

## 2023-10-05 RX ADMIN — IBUPROFEN 600 MG: 600 TABLET, FILM COATED ORAL at 11:10

## 2023-10-05 RX ADMIN — OXYCODONE HYDROCHLORIDE AND ACETAMINOPHEN 1 TABLET: 5; 325 TABLET ORAL at 04:10

## 2023-10-05 NOTE — DISCHARGE SUMMARY
"[facility] - 2nd Floor Mother/Baby Unit  Obstetrics  Discharge Summary      Patient Name: Berna Miranda  MRN: [id number]  Admission Date: 10/3/2023  Hospital Length of Stay: 2 days  Discharge Date and Time:  10/05/2023 10:07 AM  Attending Physician: Jass Bloom MD   Discharging Provider: Jass Bloom MD   Primary Care Provider: Cristóbal Pan II, MD    HPI: No notes on file        * No surgery found *     Hospital Course:   No notes on file         Final Active Diagnoses:    Diagnosis Date Noted POA    PRINCIPAL PROBLEM:  Pregnancy [Z34.90] 10/05/2023 Not Applicable      Problems Resolved During this Admission:        Significant Diagnostic Studies: N/A      Feeding Method: breast    Immunizations     Date Immunization Status Dose Route/Site Given by    10/04/23 105 MMR Incomplete 0.5 mL Subcutaneous/     10/04/23 1057 Tdap Incomplete 0.5 mL Intramuscular/           Delivery:    Episiotomy: Median   Lacerations:     Repair suture:     Repair # of packets:     Blood loss (ml):       Birth information:  YOB: 2021   Time of birth: 9:10 AM   Sex: male   Delivery type: Vaginal, Spontaneous   Gestational Age: 40w4d     Measurements    Weight: 3856 g  Weight (lbs): 8 lb 8 oz  Length: 54 cm  Length (in): 21.25"  Head circumference: 34 cm         Delivery Clinician: Delivery Providers    Delivering clinician: Jass Bloom MD   Provider Role    Alice Marrero RN Registered Nurse    Carli Stiles RN Registered Nurse    Audrey Mahan RN Registered Nurse    Socorro Tamayo RN Registered Nurse           Additional  information:  Forceps:    Vacuum:    Breech:    Observed anomalies      Living?:     Apgars    Living status: Living  Apgar Component Scores:  1 min.:  5 min.:  10 min.:  15 min.:  20 min.:    Skin color:  0  1  1      Heart rate:  2  2  2      Reflex irritability:  0  1  2      Muscle tone:  0  1  2      Respiratory effort:  1  1  2      Total:  3  " 6  9      Apgars assigned by: SHYLA LOZA RN         Placenta: Delivered:       appearance    Pending Diagnostic Studies:     None          Discharged Condition: good    Disposition: Home or Self Care    Follow Up:   Follow-up Information     Jass Bloom MD Follow up.    Specialty: Obstetrics and Gynecology  Why: Call Dr. Bloom's office to schedule 6 week postpartum appointment.  Contact information:  [address] [address]  [phone]                       Patient Instructions:   No discharge procedures on file.  Medications:  Current Discharge Medication List      CONTINUE these medications which have NOT CHANGED    Details   aspirin (ECOTRIN) 81 MG EC tablet Take 81 mg by mouth once daily.      famotidine (PEPCID) 20 MG tablet Take 20 mg by mouth once daily.      levothyroxine (SYNTHROID) 137 MCG Tab tablet Take 1 tablet (137 mcg total) by mouth before breakfast.  Qty: 30 tablet, Refills: 11    Associated Diagnoses: Hypothyroidism, unspecified type      prenatal vit no.124/iron/folic (PRENATAL VITAMIN ORAL) Take by mouth.      ondansetron (ZOFRAN-ODT) 8 MG TbDL DISSOLVE 1 TABLET ON YOUR TONGUE EVERY EIGHT TO TWELVE HOURS FOR NAUSEA OR VOMITING             Jass Bloom MD  Obstetrics  [facility] - 2nd Floor Mother/Baby Unit

## 2023-10-06 ENCOUNTER — PATIENT OUTREACH (OUTPATIENT)
Dept: ADMINISTRATIVE | Facility: CLINIC | Age: 38
End: 2023-10-06
Payer: COMMERCIAL

## 2023-10-06 NOTE — H&P
[facility] - 2nd Floor Mother/Baby Unit  Obstetrics  History & Physical    Patient Name: Berna Downing  MRN: [id number]  Admission Date: 10/3/2023  Primary Care Provider: Cristóbal Pan II, MD    Subjective:     Principal Problem:Pregnancy    History of Present Illness: PAT mild oligo    Obstetric HPI:  Patient reports None contractions, active fetal movement, No vaginal bleeding , No loss of fluid     This pregnancy has been complicated by no issues    OB History    Para Term  AB Living   1 1 1 0 0 1   SAB IAB Ectopic Multiple Live Births   0 0 0 0 1      # Outcome Date GA Lbr Gold/2nd Weight Sex Delivery Anes PTL Lv   1 Term 10/04/23 40w4d 14:29 / 02:44 3.856 kg (8 lb 8 oz) M Vag-Spont EPI N MIKI      Name: ESME DOWNING      Apgar1: 3  Apgar5: 6      Obstetric Comments   Oligohydramnios, hypothyroidism, AMA     Past Medical History:   Diagnosis Date    Hypothyroidism, unspecified     s/p thyroidectomy     Past Surgical History:   Procedure Laterality Date    FUNCTIONAL ENDOSCOPIC SINUS SURGERY (FESS)      THYROIDECTOMY      THYROIDECTOMY         No medications prior to admission.       Review of patient's allergies indicates:  No Known Allergies     Family History       Problem Relation (Age of Onset)    Atrial fibrillation Father    Hypertension Father    No Known Problems Mother, Sister          Tobacco Use    Smoking status: Never    Smokeless tobacco: Never   Substance and Sexual Activity    Alcohol use: Not Currently     Alcohol/week: 2.0 - 3.0 standard drinks of alcohol     Types: 2 - 3 Glasses of wine per week    Drug use: Never    Sexual activity: Not on file     Review of Systems   Objective:     Vital Signs (Most Recent):  Temp: 97.6 °F (36.4 °C) (10/05/23 0757)  Pulse: 89 (10/05/23 0757)  Resp: 20 (10/05/23 0757)  BP: 122/82 (10/05/23 0757)  SpO2: 99 % (10/04/23 0917) Vital Signs (24h Range):        Weight: 97.1 kg (214 lb)  Body mass index is 33.52  kg/m².    FHT: 147Cat 1 (reassuring)  TOCO:  Q 0 minutes    Physical Exam    Cervix:  Dilation:  3  Effacement:  50%  Station: -1  Presentation: Vertex     Significant Labs:  Lab Results   Component Value Date    GROUPTRH O POS 10/03/2023    STREPBCULT Positive 2023       I have personallly reviewed all pertinent lab results from the last 24 hours.    Assessment/Plan:     37 y.o. female  at 40w4d for:    Active Diagnoses:    Diagnosis Date Noted POA    PRINCIPAL PROBLEM:  Pregnancy [Z34.90] 10/05/2023 Not Applicable      Problems Resolved During this Admission:       IGNACIO Bloom MD  Obstetrics  [facility] - 2nd Floor Mother/Baby Unit

## 2023-10-22 ENCOUNTER — PATIENT MESSAGE (OUTPATIENT)
Dept: INTERNAL MEDICINE | Facility: CLINIC | Age: 38
End: 2023-10-22
Payer: COMMERCIAL

## 2023-10-23 DIAGNOSIS — E03.9 ACQUIRED HYPOTHYROIDISM: Primary | ICD-10-CM

## 2023-10-24 ENCOUNTER — LAB VISIT (OUTPATIENT)
Dept: LAB | Facility: HOSPITAL | Age: 38
End: 2023-10-24
Attending: NURSE PRACTITIONER
Payer: COMMERCIAL

## 2023-10-24 DIAGNOSIS — E03.9 ACQUIRED HYPOTHYROIDISM: ICD-10-CM

## 2023-10-24 LAB
T3FREE SERPL-MCNC: 2.89 PG/ML (ref 1.58–3.91)
T4 FREE SERPL-MCNC: 1.24 NG/DL (ref 0.7–1.48)
TSH SERPL-ACNC: 0.95 UIU/ML (ref 0.35–4.94)

## 2023-10-24 PROCEDURE — 84439 ASSAY OF FREE THYROXINE: CPT

## 2023-10-24 PROCEDURE — 84443 ASSAY THYROID STIM HORMONE: CPT

## 2023-10-24 PROCEDURE — 84481 FREE ASSAY (FT-3): CPT

## 2023-10-24 PROCEDURE — 36415 COLL VENOUS BLD VENIPUNCTURE: CPT

## 2023-10-25 ENCOUNTER — PATIENT MESSAGE (OUTPATIENT)
Dept: MATERNAL FETAL MEDICINE | Facility: CLINIC | Age: 38
End: 2023-10-25
Payer: COMMERCIAL

## 2024-03-25 ENCOUNTER — TELEPHONE (OUTPATIENT)
Dept: INTERNAL MEDICINE | Facility: CLINIC | Age: 39
End: 2024-03-25
Payer: COMMERCIAL

## 2024-03-25 DIAGNOSIS — Z13.1 SCREENING FOR DIABETES MELLITUS (DM): ICD-10-CM

## 2024-03-25 DIAGNOSIS — Z13.220 ENCOUNTER FOR LIPID SCREENING FOR CARDIOVASCULAR DISEASE: ICD-10-CM

## 2024-03-25 DIAGNOSIS — Z13.6 ENCOUNTER FOR LIPID SCREENING FOR CARDIOVASCULAR DISEASE: ICD-10-CM

## 2024-03-25 DIAGNOSIS — Z00.00 WELLNESS EXAMINATION: Primary | ICD-10-CM

## 2024-03-25 DIAGNOSIS — E03.9 HYPOTHYROIDISM, UNSPECIFIED TYPE: ICD-10-CM

## 2024-03-25 DIAGNOSIS — R73.01 IFG (IMPAIRED FASTING GLUCOSE): ICD-10-CM

## 2024-03-25 NOTE — TELEPHONE ENCOUNTER
----- Message from Benito Pritchard LPN sent at 3/25/2024 10:25 AM CDT -----  Regarding: horacio hoskins 4/2 @ 9:20  Are there any outstanding tasks in patient chart? Needs fasting labs    Is there documentation of outstanding tasks in patient chart? no    Has patient been to the ER, urgent care, or another physician since last visit?    Has patient done any blood work or x-rays since last visit?    5. PLEASE HAVE PATIENT BRING MEDICATION LIST OR BOTTLES TO EVERY OFFICE VISIT

## 2024-03-28 ENCOUNTER — LAB VISIT (OUTPATIENT)
Dept: LAB | Facility: HOSPITAL | Age: 39
End: 2024-03-28
Attending: INTERNAL MEDICINE
Payer: COMMERCIAL

## 2024-03-28 DIAGNOSIS — R73.01 IFG (IMPAIRED FASTING GLUCOSE): ICD-10-CM

## 2024-03-28 DIAGNOSIS — Z13.6 ENCOUNTER FOR LIPID SCREENING FOR CARDIOVASCULAR DISEASE: ICD-10-CM

## 2024-03-28 DIAGNOSIS — Z13.1 SCREENING FOR DIABETES MELLITUS (DM): ICD-10-CM

## 2024-03-28 DIAGNOSIS — E03.9 HYPOTHYROIDISM, UNSPECIFIED TYPE: ICD-10-CM

## 2024-03-28 DIAGNOSIS — Z00.00 WELLNESS EXAMINATION: ICD-10-CM

## 2024-03-28 DIAGNOSIS — Z13.220 ENCOUNTER FOR LIPID SCREENING FOR CARDIOVASCULAR DISEASE: ICD-10-CM

## 2024-03-28 LAB
ALBUMIN SERPL-MCNC: 4.2 G/DL (ref 3.5–5)
ALBUMIN/GLOB SERPL: 1.4 RATIO (ref 1.1–2)
ALP SERPL-CCNC: 73 UNIT/L (ref 40–150)
ALT SERPL-CCNC: 28 UNIT/L (ref 0–55)
AST SERPL-CCNC: 17 UNIT/L (ref 5–34)
BASOPHILS # BLD AUTO: 0.06 X10(3)/MCL
BASOPHILS NFR BLD AUTO: 1.1 %
BILIRUB SERPL-MCNC: 1.1 MG/DL
BUN SERPL-MCNC: 10.3 MG/DL (ref 7–18.7)
CALCIUM SERPL-MCNC: 9.1 MG/DL (ref 8.4–10.2)
CHLORIDE SERPL-SCNC: 108 MMOL/L (ref 98–107)
CHOLEST SERPL-MCNC: 204 MG/DL
CHOLEST/HDLC SERPL: 4 {RATIO} (ref 0–5)
CO2 SERPL-SCNC: 25 MMOL/L (ref 22–29)
CREAT SERPL-MCNC: 0.77 MG/DL (ref 0.55–1.02)
EOSINOPHIL # BLD AUTO: 0.08 X10(3)/MCL (ref 0–0.9)
EOSINOPHIL NFR BLD AUTO: 1.5 %
ERYTHROCYTE [DISTWIDTH] IN BLOOD BY AUTOMATED COUNT: 13.9 % (ref 11.5–17)
EST. AVERAGE GLUCOSE BLD GHB EST-MCNC: 108.3 MG/DL
GFR SERPLBLD CREATININE-BSD FMLA CKD-EPI: >60 MLS/MIN/1.73/M2
GLOBULIN SER-MCNC: 3.1 GM/DL (ref 2.4–3.5)
GLUCOSE SERPL-MCNC: 92 MG/DL (ref 74–100)
HBA1C MFR BLD: 5.4 %
HCT VFR BLD AUTO: 45.4 % (ref 37–47)
HDLC SERPL-MCNC: 55 MG/DL (ref 35–60)
HGB BLD-MCNC: 15.1 G/DL (ref 12–16)
IMM GRANULOCYTES # BLD AUTO: 0.01 X10(3)/MCL (ref 0–0.04)
IMM GRANULOCYTES NFR BLD AUTO: 0.2 %
LDLC SERPL CALC-MCNC: 133 MG/DL (ref 50–140)
LYMPHOCYTES # BLD AUTO: 1.53 X10(3)/MCL (ref 0.6–4.6)
LYMPHOCYTES NFR BLD AUTO: 29 %
MCH RBC QN AUTO: 28.8 PG (ref 27–31)
MCHC RBC AUTO-ENTMCNC: 33.3 G/DL (ref 33–36)
MCV RBC AUTO: 86.6 FL (ref 80–94)
MONOCYTES # BLD AUTO: 0.38 X10(3)/MCL (ref 0.1–1.3)
MONOCYTES NFR BLD AUTO: 7.2 %
NEUTROPHILS # BLD AUTO: 3.22 X10(3)/MCL (ref 2.1–9.2)
NEUTROPHILS NFR BLD AUTO: 61 %
NRBC BLD AUTO-RTO: 0 %
PLATELET # BLD AUTO: 239 X10(3)/MCL (ref 130–400)
PMV BLD AUTO: 9.6 FL (ref 7.4–10.4)
POTASSIUM SERPL-SCNC: 4.1 MMOL/L (ref 3.5–5.1)
PROT SERPL-MCNC: 7.3 GM/DL (ref 6.4–8.3)
RBC # BLD AUTO: 5.24 X10(6)/MCL (ref 4.2–5.4)
SODIUM SERPL-SCNC: 140 MMOL/L (ref 136–145)
T4 FREE SERPL-MCNC: 1.48 NG/DL (ref 0.7–1.48)
TRIGL SERPL-MCNC: 82 MG/DL (ref 37–140)
TSH SERPL-ACNC: 0.58 UIU/ML (ref 0.35–4.94)
VLDLC SERPL CALC-MCNC: 16 MG/DL
WBC # SPEC AUTO: 5.28 X10(3)/MCL (ref 4.5–11.5)

## 2024-03-28 PROCEDURE — 80053 COMPREHEN METABOLIC PANEL: CPT

## 2024-03-28 PROCEDURE — 83036 HEMOGLOBIN GLYCOSYLATED A1C: CPT

## 2024-03-28 PROCEDURE — 36415 COLL VENOUS BLD VENIPUNCTURE: CPT

## 2024-03-28 PROCEDURE — 85025 COMPLETE CBC W/AUTO DIFF WBC: CPT

## 2024-03-28 PROCEDURE — 84443 ASSAY THYROID STIM HORMONE: CPT

## 2024-03-28 PROCEDURE — 84439 ASSAY OF FREE THYROXINE: CPT

## 2024-03-28 PROCEDURE — 80061 LIPID PANEL: CPT

## 2024-04-02 ENCOUNTER — OFFICE VISIT (OUTPATIENT)
Dept: INTERNAL MEDICINE | Facility: CLINIC | Age: 39
End: 2024-04-02
Payer: COMMERCIAL

## 2024-04-02 VITALS
DIASTOLIC BLOOD PRESSURE: 68 MMHG | RESPIRATION RATE: 18 BRPM | WEIGHT: 187 LBS | BODY MASS INDEX: 29.35 KG/M2 | OXYGEN SATURATION: 99 % | SYSTOLIC BLOOD PRESSURE: 122 MMHG | HEIGHT: 67 IN | HEART RATE: 78 BPM

## 2024-04-02 DIAGNOSIS — Z00.00 ANNUAL PHYSICAL EXAM: Primary | ICD-10-CM

## 2024-04-02 DIAGNOSIS — E03.9 HYPOTHYROIDISM, UNSPECIFIED TYPE: ICD-10-CM

## 2024-04-02 PROCEDURE — 3078F DIAST BP <80 MM HG: CPT | Mod: CPTII,,, | Performed by: INTERNAL MEDICINE

## 2024-04-02 PROCEDURE — 3074F SYST BP LT 130 MM HG: CPT | Mod: CPTII,,, | Performed by: INTERNAL MEDICINE

## 2024-04-02 PROCEDURE — 1159F MED LIST DOCD IN RCRD: CPT | Mod: CPTII,,, | Performed by: INTERNAL MEDICINE

## 2024-04-02 PROCEDURE — 99395 PREV VISIT EST AGE 18-39: CPT | Mod: ,,, | Performed by: INTERNAL MEDICINE

## 2024-04-02 PROCEDURE — 3008F BODY MASS INDEX DOCD: CPT | Mod: CPTII,,, | Performed by: INTERNAL MEDICINE

## 2024-04-02 PROCEDURE — 3044F HG A1C LEVEL LT 7.0%: CPT | Mod: CPTII,,, | Performed by: INTERNAL MEDICINE

## 2024-04-02 RX ORDER — MELOXICAM 15 MG/1
15 TABLET ORAL DAILY
Qty: 30 TABLET | Refills: 3 | Status: SHIPPED | OUTPATIENT
Start: 2024-04-02

## 2024-04-02 NOTE — PROGRESS NOTES
Patient ID: Berna Miranda is a 38 y.o. female.    Chief Complaint: Annual Exam (Labs 3/28)      HPI:   Patient presents here today for above reason.     Denise is a 38-year-old female here for Up-to-date today in follow-up/annual visit actually doing great labs are all within normal limits age appropriate screening is within normal limits.  History of corrected hypothyroidism on 137 mcg of Synthroid.  Dealing with some plantar fasciitis and only thing that really helps some meloxicam.    The patient's Health Maintenance was reviewed and the following appears to be due at this time:   Health Maintenance Due   Topic Date Due    Hepatitis C Screening  Never done    COVID-19 Vaccine (3 - 2023-24 season) 09/01/2023        Past Medical History:  Past Medical History:   Diagnosis Date    Hypothyroidism, unspecified     s/p thyroidectomy     Past Surgical History:   Procedure Laterality Date    FUNCTIONAL ENDOSCOPIC SINUS SURGERY (FESS)      THYROIDECTOMY  2013    THYROIDECTOMY       Review of patient's allergies indicates:  No Known Allergies  Current Outpatient Medications on File Prior to Visit   Medication Sig Dispense Refill    levothyroxine (SYNTHROID) 137 MCG Tab tablet Take 1 tablet (137 mcg total) by mouth before breakfast. 30 tablet 11    prenatal vit no.124/iron/folic (PRENATAL VITAMIN ORAL) Take by mouth.      aspirin (ECOTRIN) 81 MG EC tablet Take 81 mg by mouth once daily.      famotidine (PEPCID) 20 MG tablet Take 20 mg by mouth once daily.      ondansetron (ZOFRAN-ODT) 8 MG TbDL DISSOLVE 1 TABLET ON YOUR TONGUE EVERY EIGHT TO TWELVE HOURS FOR NAUSEA OR VOMITING       No current facility-administered medications on file prior to visit.     Social History     Socioeconomic History    Marital status:    Occupational History    Occupation: RN   Tobacco Use    Smoking status: Never    Smokeless tobacco: Never   Substance and Sexual Activity    Alcohol use: Not Currently     Alcohol/week: 2.0 - 3.0  "standard drinks of alcohol     Types: 2 - 3 Glasses of wine per week    Drug use: Never     Family History   Problem Relation Age of Onset    No Known Problems Mother     Hypertension Father     Atrial fibrillation Father     No Known Problems Sister        ROS:   Comprehensive review of systems was performed and is negative except as noted above    Vitals/PE:   /68 (BP Location: Left arm, Patient Position: Sitting)   Pulse 78   Resp 18   Ht 5' 7" (1.702 m)   Wt 84.8 kg (187 lb)   LMP 03/31/2024   SpO2 99%   BMI 29.29 kg/m²   Physical Exam    General: Alert and oriented, No acute distress.   Eye: Normal conjunctiva without exudate.  HENMT: Normocephalic/AT, Normal hearing, Oral mucosa is moist and pink   Neck: No goiter visualized.   Respiratory: Lungs CTAB, Respirations are non-labored, Breath sounds are equal, Symmetrical chest wall expansion.  Cardiovascular: Normal rate, Regular rhythm, No murmur, No edema.   Gastrointestinal: Non-distended.   Genitourinary: Deferred.  Musculoskeletal: Normal ROM, Normal gait, No deformities or amputations.  Integumentary: Warm, Dry, Intact. No diaphoresis, or flushing.  Neurologic: No focal deficits, Cranial Nerves II-XII are grossly intact.   Psychiatric: Cooperative, Appropriate mood & affect, Normal judgment, Non-suicidal.    Assessment/Plan:       1. Annual physical exam    2. Hypothyroidism, unspecified type        Plan:  One labs are all within normal limits   2.  Continue current dose of Synthroid  3.  Okay for Mobic 15 mg p.r.n.  4.  Continue with current therapy        Education and counseling done face to face regarding medical conditions and plan. Contact office if new symptoms develop. Should any symptoms ever significantly worsen seek emergency medical attention/go to ER. Follow up at least yearly for wellness or sooner PRN. Nurse to call patient with any results. The patient is receptive, expresses understanding and is agreeable to plan. All questions " have been answered.    No follow-ups on file.

## 2024-04-22 DIAGNOSIS — H90.0 CONDUCTIVE HEARING LOSS, BILATERAL: Primary | ICD-10-CM

## 2024-04-26 ENCOUNTER — HOSPITAL ENCOUNTER (OUTPATIENT)
Dept: RADIOLOGY | Facility: HOSPITAL | Age: 39
Discharge: HOME | End: 2024-04-26
Attending: OTOLARYNGOLOGY
Payer: COMMERCIAL

## 2024-04-26 DIAGNOSIS — H90.0 CONDUCTIVE HEARING LOSS, BILATERAL: ICD-10-CM

## 2024-04-26 PROCEDURE — A9577 INJ MULTIHANCE: HCPCS | Performed by: OTOLARYNGOLOGY

## 2024-04-26 PROCEDURE — 70553 MRI BRAIN STEM W/O & W/DYE: CPT | Mod: TC

## 2024-04-26 PROCEDURE — 25500020 PHARM REV CODE 255: Performed by: OTOLARYNGOLOGY

## 2024-04-26 RX ADMIN — GADOBENATE DIMEGLUMINE 17 ML: 529 INJECTION, SOLUTION INTRAVENOUS at 01:04

## 2024-05-21 DIAGNOSIS — E03.9 HYPOTHYROIDISM, UNSPECIFIED TYPE: ICD-10-CM

## 2024-05-21 RX ORDER — LEVOTHYROXINE SODIUM 137 UG/1
137 TABLET ORAL
Qty: 30 TABLET | Refills: 11 | Status: SHIPPED | OUTPATIENT
Start: 2024-05-21 | End: 2025-05-21

## 2024-12-10 ENCOUNTER — ON-DEMAND VIRTUAL (OUTPATIENT)
Dept: URGENT CARE | Facility: CLINIC | Age: 39
End: 2024-12-10
Payer: COMMERCIAL

## 2024-12-10 DIAGNOSIS — J01.00 ACUTE NON-RECURRENT MAXILLARY SINUSITIS: Primary | ICD-10-CM

## 2024-12-10 PROCEDURE — 99213 OFFICE O/P EST LOW 20 MIN: CPT | Mod: CC,95,, | Performed by: PHYSICIAN ASSISTANT

## 2024-12-10 RX ORDER — AMOXICILLIN 875 MG/1
875 TABLET, FILM COATED ORAL EVERY 12 HOURS
Qty: 20 TABLET | Refills: 0 | Status: SHIPPED | OUTPATIENT
Start: 2024-12-10 | End: 2024-12-20

## 2024-12-10 RX ORDER — PROMETHAZINE HYDROCHLORIDE AND DEXTROMETHORPHAN HYDROBROMIDE 6.25; 15 MG/5ML; MG/5ML
5 SYRUP ORAL EVERY 6 HOURS PRN
Qty: 100 ML | Refills: 0 | Status: SHIPPED | OUTPATIENT
Start: 2024-12-10 | End: 2024-12-20

## 2024-12-10 NOTE — PROGRESS NOTES
Subjective:      Patient ID: Berna Miranda is a 39 y.o. female. At home    Vitals:  vitals were not taken for this visit.     Chief Complaint: Sinus Problem      Visit Type: TELE AUDIOVISUAL    Present with the patient at the time of consultation: TELEMED PRESENT WITH PATIENT: None    Past Medical History:   Diagnosis Date    Hypothyroidism, unspecified     s/p thyroidectomy     Past Surgical History:   Procedure Laterality Date    FUNCTIONAL ENDOSCOPIC SINUS SURGERY (FESS)      THYROIDECTOMY  2013    THYROIDECTOMY       Review of patient's allergies indicates:  No Known Allergies  Current Outpatient Medications on File Prior to Visit   Medication Sig Dispense Refill    aspirin (ECOTRIN) 81 MG EC tablet Take 81 mg by mouth once daily.      famotidine (PEPCID) 20 MG tablet Take 20 mg by mouth once daily.      levothyroxine (SYNTHROID) 137 MCG Tab tablet Take 1 tablet (137 mcg total) by mouth before breakfast. 30 tablet 11    meloxicam (MOBIC) 15 MG tablet Take 1 tablet (15 mg total) by mouth once daily. 30 tablet 3    ondansetron (ZOFRAN-ODT) 8 MG TbDL DISSOLVE 1 TABLET ON YOUR TONGUE EVERY EIGHT TO TWELVE HOURS FOR NAUSEA OR VOMITING      prenatal vit no.124/iron/folic (PRENATAL VITAMIN ORAL) Take by mouth.       No current facility-administered medications on file prior to visit.     Family History   Problem Relation Name Age of Onset    No Known Problems Mother      Hypertension Father      Atrial fibrillation Father      No Known Problems Sister         Medications Ordered                [facility] #[id number] - [place], [place] - [address]   [address] [facility] [address]    Telephone: [phone]   Fax: [phone]   Hours: Not open 24 hours                         E-Prescribed (2 of 2)              amoxicillin (AMOXIL) 875 MG tablet    Sig: Take 1 tablet (875 mg total) by mouth every 12 (twelve) hours. for 10 days       Start: 12/10/24     Quantity: 20 tablet Refills: 0                          promethazine-dextromethorphan (PROMETHAZINE-DM) 6.25-15 mg/5 mL Syrp    Sig: Take 5 mLs by mouth every 6 (six) hours as needed (cough).       Start: 12/10/24     Quantity: 100 mL Refills: 0                           Ohs Peq Odvv Intake    12/10/2024  9:08 AM CST - Filed by Patient   What is your current physical address in the event of a medical emergency? [address] [place] [address]   Are you able to take your vital signs? No   Please attach any relevant images or files    Is your employer contracted with [facility]? Yes   Please enter your employer supplied coupon code. N/a         Sinus Problem  This is a new problem. Episode onset: 7 days. The problem is unchanged. There has been no fever. Associated symptoms include chills, congestion, coughing, ear pain, headaches, a hoarse voice, sinus pressure and a sore throat. Pertinent negatives include no shortness of breath or swollen glands. Past treatments include nasal decongestants (allegra, ibuprofen). The treatment provided mild relief.       Constitution: Positive for chills.   HENT:  Positive for ear pain, congestion, sinus pressure and sore throat.    Respiratory:  Positive for cough. Negative for shortness of breath.    Neurological:  Positive for headaches.        Objective:   The physical exam was conducted virtually.  Physical Exam    Assessment:     1. Acute non-recurrent maxillary sinusitis        Plan:       Acute non-recurrent maxillary sinusitis    Other orders  -     amoxicillin (AMOXIL) 875 MG tablet; Take 1 tablet (875 mg total) by mouth every 12 (twelve) hours. for 10 days  Dispense: 20 tablet; Refill: 0  -     promethazine-dextromethorphan (PROMETHAZINE-DM) 6.25-15 mg/5 mL Syrp; Take 5 mLs by mouth every 6 (six) hours as needed (cough).  Dispense: 100 mL; Refill: 0

## 2024-12-10 NOTE — PATIENT INSTRUCTIONS
You have been diagnosed with a sinus infection.   I have prescribed and antibiotic. Please start today and take until finished.   You can also take over the counter meds such as Flonase, Claritin, Dayquil etc.     If your symptoms persist please follow up with your pcp.  If you experience any severe symptoms go to the ER.      Thank you for choosing [facility]!    Our goal in the [facility] to always provide outstanding medical care. You may receive a survey by mail or e-mail in the next week regarding your experience today. We would greatly appreciate you completing and returning the survey. Your feedback provides us with a way to recognize our staff who provide very good care, and it helps us learn how to improve when your experience was below our aspiration of excellence.         We appreciate you trusting us with your medical care. We hope you feel better soon. We will be happy to take care of you for all of your future medical needs.    You must understand that you've received Virtual  treatment only and that you may be released before all your medical problems are known or treated. You, the patient, will arrange for follow up care as instructed.    Follow up with your PCP or specialty clinic as directed in the next 1-2 weeks if not improved or as needed.  You can call [phone] to schedule an appointment with the appropriate provider.    If your condition worsens we recommend that you receive another evaluation in person, with your primary care provider, urgent care or at the emergency room immediately or contact your primary medical clinics after hours call service to discuss your concerns.

## 2024-12-12 ENCOUNTER — PATIENT MESSAGE (OUTPATIENT)
Dept: INTERNAL MEDICINE | Facility: CLINIC | Age: 39
End: 2024-12-12
Payer: COMMERCIAL

## 2024-12-12 DIAGNOSIS — H92.09 OTALGIA, UNSPECIFIED LATERALITY: Primary | ICD-10-CM

## 2024-12-12 RX ORDER — METHYLPREDNISOLONE 4 MG/1
TABLET ORAL
Qty: 21 EACH | Refills: 0 | Status: SHIPPED | OUTPATIENT
Start: 2024-12-12

## 2025-02-04 RX ORDER — BENZONATATE 100 MG/1
100 CAPSULE ORAL 3 TIMES DAILY PRN
Qty: 30 CAPSULE | Refills: 0 | Status: SHIPPED | OUTPATIENT
Start: 2025-02-04 | End: 2025-02-14

## 2025-04-01 ENCOUNTER — TELEPHONE (OUTPATIENT)
Dept: INTERNAL MEDICINE | Facility: CLINIC | Age: 40
End: 2025-04-01
Payer: COMMERCIAL

## 2025-04-01 DIAGNOSIS — Z13.6 ENCOUNTER FOR LIPID SCREENING FOR CARDIOVASCULAR DISEASE: ICD-10-CM

## 2025-04-01 DIAGNOSIS — Z00.00 WELLNESS EXAMINATION: Primary | ICD-10-CM

## 2025-04-01 DIAGNOSIS — E03.9 HYPOTHYROIDISM AFFECTING PREGNANCY IN FIRST TRIMESTER: ICD-10-CM

## 2025-04-01 DIAGNOSIS — Z13.220 ENCOUNTER FOR LIPID SCREENING FOR CARDIOVASCULAR DISEASE: ICD-10-CM

## 2025-04-01 DIAGNOSIS — R73.01 IFG (IMPAIRED FASTING GLUCOSE): ICD-10-CM

## 2025-04-01 DIAGNOSIS — E03.9 ACQUIRED HYPOTHYROIDISM: ICD-10-CM

## 2025-04-01 DIAGNOSIS — O99.281 HYPOTHYROIDISM AFFECTING PREGNANCY IN FIRST TRIMESTER: ICD-10-CM

## 2025-04-01 DIAGNOSIS — Z13.1 SCREENING FOR DIABETES MELLITUS (DM): ICD-10-CM

## 2025-04-01 DIAGNOSIS — E03.9 HYPOTHYROIDISM, UNSPECIFIED TYPE: ICD-10-CM

## 2025-04-01 NOTE — TELEPHONE ENCOUNTER
----- Message from Nurse Oliver sent at 4/1/2025  7:39 AM CDT -----  Regarding: horacio helm 4/9 @9  Are there any outstanding tasks in patient chart? Needs fasting labsIs there documentation of outstanding tasks in patient chart? noHas patient been to the ER, urgent care, or another physician since last visit?Has patient done any blood work or x-rays since last visit?5. PLEASE HAVE PATIENT BRING MEDICATION LIST OR BOTTLES TO EVERY OFFICE VISIT

## 2025-04-05 ENCOUNTER — LAB VISIT (OUTPATIENT)
Dept: LAB | Facility: HOSPITAL | Age: 40
End: 2025-04-05
Attending: INTERNAL MEDICINE
Payer: COMMERCIAL

## 2025-04-05 DIAGNOSIS — Z13.220 ENCOUNTER FOR LIPID SCREENING FOR CARDIOVASCULAR DISEASE: ICD-10-CM

## 2025-04-05 DIAGNOSIS — Z13.1 SCREENING FOR DIABETES MELLITUS (DM): ICD-10-CM

## 2025-04-05 DIAGNOSIS — Z00.00 WELLNESS EXAMINATION: ICD-10-CM

## 2025-04-05 DIAGNOSIS — R73.01 IFG (IMPAIRED FASTING GLUCOSE): ICD-10-CM

## 2025-04-05 DIAGNOSIS — O99.281 HYPOTHYROIDISM AFFECTING PREGNANCY IN FIRST TRIMESTER: ICD-10-CM

## 2025-04-05 DIAGNOSIS — E03.9 ACQUIRED HYPOTHYROIDISM: ICD-10-CM

## 2025-04-05 DIAGNOSIS — E03.9 HYPOTHYROIDISM, UNSPECIFIED TYPE: ICD-10-CM

## 2025-04-05 DIAGNOSIS — E03.9 HYPOTHYROIDISM AFFECTING PREGNANCY IN FIRST TRIMESTER: ICD-10-CM

## 2025-04-05 DIAGNOSIS — Z13.6 ENCOUNTER FOR LIPID SCREENING FOR CARDIOVASCULAR DISEASE: ICD-10-CM

## 2025-04-05 LAB
ALBUMIN SERPL-MCNC: 3.8 G/DL (ref 3.5–5)
ALBUMIN/GLOB SERPL: 1.4 RATIO (ref 1.1–2)
ALP SERPL-CCNC: 74 UNIT/L (ref 40–150)
ALT SERPL-CCNC: 19 UNIT/L (ref 0–55)
ANION GAP SERPL CALC-SCNC: 5 MEQ/L
AST SERPL-CCNC: 14 UNIT/L (ref 11–45)
BASOPHILS # BLD AUTO: 0.06 X10(3)/MCL
BASOPHILS NFR BLD AUTO: 1.1 %
BILIRUB SERPL-MCNC: 0.7 MG/DL
BUN SERPL-MCNC: 12.1 MG/DL (ref 7–18.7)
CALCIUM SERPL-MCNC: 8.6 MG/DL (ref 8.4–10.2)
CHLORIDE SERPL-SCNC: 107 MMOL/L (ref 98–107)
CHOLEST SERPL-MCNC: 170 MG/DL
CHOLEST/HDLC SERPL: 3 {RATIO} (ref 0–5)
CO2 SERPL-SCNC: 26 MMOL/L (ref 22–29)
CREAT SERPL-MCNC: 0.71 MG/DL (ref 0.55–1.02)
CREAT/UREA NIT SERPL: 17
EOSINOPHIL # BLD AUTO: 0.08 X10(3)/MCL (ref 0–0.9)
EOSINOPHIL NFR BLD AUTO: 1.5 %
ERYTHROCYTE [DISTWIDTH] IN BLOOD BY AUTOMATED COUNT: 12 % (ref 11.5–17)
EST. AVERAGE GLUCOSE BLD GHB EST-MCNC: 108.3 MG/DL
GFR SERPLBLD CREATININE-BSD FMLA CKD-EPI: >60 ML/MIN/1.73/M2
GLOBULIN SER-MCNC: 2.8 GM/DL (ref 2.4–3.5)
GLUCOSE SERPL-MCNC: 89 MG/DL (ref 74–100)
HBA1C MFR BLD: 5.4 %
HCT VFR BLD AUTO: 44.2 % (ref 37–47)
HDLC SERPL-MCNC: 52 MG/DL (ref 35–60)
HGB BLD-MCNC: 14.7 G/DL (ref 12–16)
IMM GRANULOCYTES # BLD AUTO: 0.01 X10(3)/MCL (ref 0–0.04)
IMM GRANULOCYTES NFR BLD AUTO: 0.2 %
LDLC SERPL CALC-MCNC: 107 MG/DL (ref 50–140)
LYMPHOCYTES # BLD AUTO: 1.91 X10(3)/MCL (ref 0.6–4.6)
LYMPHOCYTES NFR BLD AUTO: 36.3 %
MCH RBC QN AUTO: 28.9 PG (ref 27–31)
MCHC RBC AUTO-ENTMCNC: 33.3 G/DL (ref 33–36)
MCV RBC AUTO: 86.8 FL (ref 80–94)
MONOCYTES # BLD AUTO: 0.32 X10(3)/MCL (ref 0.1–1.3)
MONOCYTES NFR BLD AUTO: 6.1 %
NEUTROPHILS # BLD AUTO: 2.88 X10(3)/MCL (ref 2.1–9.2)
NEUTROPHILS NFR BLD AUTO: 54.8 %
NRBC BLD AUTO-RTO: 0 %
PLATELET # BLD AUTO: 228 X10(3)/MCL (ref 130–400)
PMV BLD AUTO: 9.2 FL (ref 7.4–10.4)
POTASSIUM SERPL-SCNC: 4.8 MMOL/L (ref 3.5–5.1)
PROT SERPL-MCNC: 6.6 GM/DL (ref 6.4–8.3)
RBC # BLD AUTO: 5.09 X10(6)/MCL (ref 4.2–5.4)
SODIUM SERPL-SCNC: 138 MMOL/L (ref 136–145)
T4 FREE SERPL-MCNC: 1.52 NG/DL (ref 0.7–1.48)
TRIGL SERPL-MCNC: 57 MG/DL (ref 37–140)
TSH SERPL-ACNC: 0.3 UIU/ML (ref 0.35–4.94)
VLDLC SERPL CALC-MCNC: 11 MG/DL
WBC # BLD AUTO: 5.26 X10(3)/MCL (ref 4.5–11.5)

## 2025-04-05 PROCEDURE — 80061 LIPID PANEL: CPT

## 2025-04-05 PROCEDURE — 83036 HEMOGLOBIN GLYCOSYLATED A1C: CPT

## 2025-04-05 PROCEDURE — 36415 COLL VENOUS BLD VENIPUNCTURE: CPT

## 2025-04-05 PROCEDURE — 84443 ASSAY THYROID STIM HORMONE: CPT

## 2025-04-05 PROCEDURE — 84439 ASSAY OF FREE THYROXINE: CPT

## 2025-04-05 PROCEDURE — 80053 COMPREHEN METABOLIC PANEL: CPT

## 2025-04-05 PROCEDURE — 85025 COMPLETE CBC W/AUTO DIFF WBC: CPT

## 2025-04-09 ENCOUNTER — OFFICE VISIT (OUTPATIENT)
Dept: INTERNAL MEDICINE | Facility: CLINIC | Age: 40
End: 2025-04-09
Payer: COMMERCIAL

## 2025-04-09 VITALS
BODY MASS INDEX: 27.47 KG/M2 | HEART RATE: 83 BPM | HEIGHT: 67 IN | DIASTOLIC BLOOD PRESSURE: 68 MMHG | SYSTOLIC BLOOD PRESSURE: 106 MMHG | OXYGEN SATURATION: 97 % | RESPIRATION RATE: 16 BRPM | WEIGHT: 175 LBS

## 2025-04-09 DIAGNOSIS — H92.09 OTALGIA, UNSPECIFIED LATERALITY: Primary | ICD-10-CM

## 2025-04-09 DIAGNOSIS — Z00.00 ANNUAL PHYSICAL EXAM: Primary | ICD-10-CM

## 2025-04-09 DIAGNOSIS — E03.9 HYPOTHYROIDISM, UNSPECIFIED TYPE: ICD-10-CM

## 2025-04-09 PROCEDURE — 3074F SYST BP LT 130 MM HG: CPT | Mod: CPTII,,, | Performed by: INTERNAL MEDICINE

## 2025-04-09 PROCEDURE — 1159F MED LIST DOCD IN RCRD: CPT | Mod: CPTII,,, | Performed by: INTERNAL MEDICINE

## 2025-04-09 PROCEDURE — 99395 PREV VISIT EST AGE 18-39: CPT | Mod: ,,, | Performed by: INTERNAL MEDICINE

## 2025-04-09 PROCEDURE — 1160F RVW MEDS BY RX/DR IN RCRD: CPT | Mod: CPTII,,, | Performed by: INTERNAL MEDICINE

## 2025-04-09 PROCEDURE — 3078F DIAST BP <80 MM HG: CPT | Mod: CPTII,,, | Performed by: INTERNAL MEDICINE

## 2025-04-09 PROCEDURE — 3044F HG A1C LEVEL LT 7.0%: CPT | Mod: CPTII,,, | Performed by: INTERNAL MEDICINE

## 2025-04-09 PROCEDURE — 3008F BODY MASS INDEX DOCD: CPT | Mod: CPTII,,, | Performed by: INTERNAL MEDICINE

## 2025-04-09 RX ORDER — LEVOTHYROXINE SODIUM 137 UG/1
137 TABLET ORAL
Qty: 30 TABLET | Refills: 11 | Status: SHIPPED | OUTPATIENT
Start: 2025-04-09 | End: 2026-04-09

## 2025-04-09 RX ORDER — MELOXICAM 15 MG/1
15 TABLET ORAL DAILY
Qty: 30 TABLET | Refills: 3 | Status: SHIPPED | OUTPATIENT
Start: 2025-04-09

## 2025-04-09 NOTE — PROGRESS NOTES
"Patient ID: Berna Miranda is a 39 y.o. female.    Chief Complaint: Annual Exam (Labs done 4/5)      HPI:   Patient presents here today for above reason.     This is a 39-year-old female presents today annual visit actually doing great labs show just a slight hyperthyroid state.  She is taking Synthroid 1372 tablets on Sundays and 1 daily afterwards.  Her T4 is a 1.5.  Otherwise age-appropriate screening up-to-date here for follow-up    The patient's Health Maintenance was reviewed and the following appears to be due at this time:   Health Maintenance Due   Topic Date Due    Hepatitis C Screening  Never done    Influenza Vaccine (1) 09/01/2024    COVID-19 Vaccine (3 - 2024-25 season) 09/01/2024        Past Medical History:  Past Medical History:   Diagnosis Date    Hypothyroidism, unspecified     s/p thyroidectomy     Past Surgical History:   Procedure Laterality Date    FUNCTIONAL ENDOSCOPIC SINUS SURGERY (FESS)      THYROIDECTOMY  2013    THYROIDECTOMY       Review of patient's allergies indicates:  No Known Allergies  Medications Ordered Prior to Encounter[1]  Social History[2]  Family History   Problem Relation Name Age of Onset    No Known Problems Mother      Hypertension Father      Atrial fibrillation Father      No Known Problems Sister         ROS:   Comprehensive review of systems was performed and is negative except as noted above    Vitals/PE:   /68 (BP Location: Left arm, Patient Position: Sitting)   Pulse 83   Resp 16   Ht 5' 7" (1.702 m)   Wt 79.4 kg (175 lb)   LMP 03/28/2025   SpO2 97%   BMI 27.41 kg/m²   Physical Exam    General: Alert and oriented, No acute distress.   Eye: Normal conjunctiva without exudate.  HENMT: Normocephalic/AT, Normal hearing, Oral mucosa is moist and pink   Neck: No goiter visualized.   Respiratory: Lungs CTAB, Respirations are non-labored, Breath sounds are equal, Symmetrical chest wall expansion.  Cardiovascular: Normal rate, Regular rhythm, No murmur, " No edema.   Gastrointestinal: Non-distended.   Genitourinary: Deferred.  Musculoskeletal: Normal ROM, Normal gait, No deformities or amputations.  Integumentary: Warm, Dry, Intact. No diaphoresis, or flushing.  Neurologic: No focal deficits, Cranial Nerves II-XII are grossly intact.   Psychiatric: Cooperative, Appropriate mood & affect, Normal judgment, Non-suicidal.    Assessment/Plan:       1. Annual physical exam         Plan:   1. Change Synthroid to 137 daily  Continue current therapy age-appropriate screening up-to-date here for follow-up    Education and counseling done face to face regarding medical conditions and plan. Contact office if new symptoms develop. Should any symptoms ever significantly worsen seek emergency medical attention/go to ER. Follow up at least yearly for wellness or sooner PRN. Nurse to call patient with any results. The patient is receptive, expresses understanding and is agreeable to plan. All questions have been answered.    No follow-ups on file.             [1]   Current Outpatient Medications on File Prior to Visit   Medication Sig Dispense Refill    prenatal vit no.124/iron/folic (PRENATAL VITAMIN ORAL) Take by mouth.      [DISCONTINUED] levothyroxine (SYNTHROID) 137 MCG Tab tablet Take 1 tablet (137 mcg total) by mouth before breakfast. 30 tablet 11    [DISCONTINUED] meloxicam (MOBIC) 15 MG tablet Take 1 tablet (15 mg total) by mouth once daily. 30 tablet 3    aspirin (ECOTRIN) 81 MG EC tablet Take 81 mg by mouth once daily.      famotidine (PEPCID) 20 MG tablet Take 20 mg by mouth once daily.      methylPREDNISolone (MEDROL DOSEPACK) 4 mg tablet use as directed 21 each 0    ondansetron (ZOFRAN-ODT) 8 MG TbDL DISSOLVE 1 TABLET ON YOUR TONGUE EVERY EIGHT TO TWELVE HOURS FOR NAUSEA OR VOMITING       No current facility-administered medications on file prior to visit.   [2]   Social History  Socioeconomic History    Marital status:    Occupational History    Occupation: RN    Tobacco Use    Smoking status: Never    Smokeless tobacco: Never   Substance and Sexual Activity    Alcohol use: Not Currently     Alcohol/week: 2.0 - 3.0 standard drinks of alcohol     Types: 2 - 3 Glasses of wine per week    Drug use: Never     Social Drivers of Health     Financial Resource Strain: Low Risk  (4/2/2025)    Overall Financial Resource Strain (CARDIA)     Difficulty of Paying Living Expenses: Not hard at all   Food Insecurity: No Food Insecurity (4/2/2025)    Hunger Vital Sign     Worried About Running Out of Food in the Last Year: Never true     Ran Out of Food in the Last Year: Never true   Transportation Needs: No Transportation Needs (4/2/2025)    PRAPARE - Transportation     Lack of Transportation (Medical): No     Lack of Transportation (Non-Medical): No   Physical Activity: Insufficiently Active (4/2/2025)    Exercise Vital Sign     Days of Exercise per Week: 3 days     Minutes of Exercise per Session: 30 min   Stress: Stress Concern Present (4/2/2025)    [employer] - Occupational Stress Questionnaire     Feeling of Stress : To some extent   Housing Stability: Low Risk  (4/2/2025)    Housing Stability Vital Sign     Unable to Pay for Housing in the Last Year: No     Number of Times Moved in the Last Year: 0     Homeless in the Last Year: No

## 2025-06-16 DIAGNOSIS — E03.9 HYPOTHYROIDISM, UNSPECIFIED TYPE: ICD-10-CM

## 2025-06-16 RX ORDER — LEVOTHYROXINE SODIUM 137 UG/1
137 TABLET ORAL
Qty: 90 TABLET | Refills: 3 | Status: SHIPPED | OUTPATIENT
Start: 2025-06-16 | End: 2026-06-16

## 2025-07-07 DIAGNOSIS — H92.09 OTALGIA, UNSPECIFIED LATERALITY: ICD-10-CM

## 2025-07-07 RX ORDER — MELOXICAM 15 MG/1
15 TABLET ORAL DAILY
Qty: 30 TABLET | Refills: 3 | Status: SHIPPED | OUTPATIENT
Start: 2025-07-07